# Patient Record
Sex: FEMALE | Race: WHITE | Employment: STUDENT | URBAN - METROPOLITAN AREA
[De-identification: names, ages, dates, MRNs, and addresses within clinical notes are randomized per-mention and may not be internally consistent; named-entity substitution may affect disease eponyms.]

---

## 2018-07-13 ENCOUNTER — OFFICE VISIT (OUTPATIENT)
Dept: ORTHOPEDIC SURGERY | Age: 23
End: 2018-07-13

## 2018-07-13 VITALS
HEIGHT: 65 IN | WEIGHT: 210 LBS | DIASTOLIC BLOOD PRESSURE: 70 MMHG | BODY MASS INDEX: 34.99 KG/M2 | HEART RATE: 76 BPM | SYSTOLIC BLOOD PRESSURE: 123 MMHG

## 2018-07-13 DIAGNOSIS — S93.431A SPRAIN OF TIBIOFIBULAR LIGAMENT OF RIGHT ANKLE, INITIAL ENCOUNTER: Primary | ICD-10-CM

## 2018-07-13 PROCEDURE — 99213 OFFICE O/P EST LOW 20 MIN: CPT | Performed by: ORTHOPAEDIC SURGERY

## 2018-07-13 NOTE — LETTER
[facility]  [address]  [address] [facility] [address]  Phone: [phone]  Fax: [phone]    Marichuy Kessler MD        July 13, 2018     Patient: Cyndi Seay   YOB: 1995   Date of Visit: 7/13/2018       To Whom It May Concern: It is my medical opinion that Ace Santiago may return to light duty immediately with the following restrictions: no running or jumping for 4 weeks. If you have any questions or concerns, please don't hesitate to call.     Sincerely,            Marichuy Kessler MD

## 2018-07-22 NOTE — PROGRESS NOTES
Date of current encounter: 7/13/2018  Chief Complaint    Right Ankle Pain ([facility]; New, ED referral for RT ankle pain. XR on 7/10/18/ picnic table collasped on leg, DOI 7/10/18 )      History of Present Illness:  Kenyetta Ga is a 23 y.o. 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 While working at the summer [employer] a picnic table collapsed on her resulting in a twisting injury and landing on her right leg. This occurred on July 10 and she was seen in the ER on the date of injury. Initially after the injury she had difficulty bearing weight which prompted her to seek treatment in the ER. She also had some soreness in her right knee which seems to be resolving. X-rays in the ER were negative for fracture. She was placed in an Aircast stirrup splint and recommended for reduced activity and interval follow-up. She reports her pain has improved 3 days since the injury. Current pain is as described below. The more she is on the ankle more uncomfortable it becomes. She is wearing her Aircast stirrup splint but also has an over-the-counter lace up brace available. She is accompanied by her father. She is noticing improved pain with her naproxen. No history of prior knee or ankle injury or surgery.   Pain Assessment  Location of Pain: Ankle  Location Modifiers: Right  Severity of Pain: 4  Quality of Pain: Aching, Dull  Duration of Pain: A few days  Frequency of Pain: Intermittent  Aggravating Factors: Walking, Standing  Limiting Behavior: Some  Relieving Factors: Rest  Result of Injury: Yes  Work-Related Injury: Yes  Are there other pain locations you wish to document?: No    Medical History:  Current Outpatient Prescriptions   Medication Sig Dispense Refill    Pseudoephedrine HCl (SUDAFED PO) Take by mouth      Norgestim-Eth Estrad Triphasic (TRI-SPRINTEC PO) Take by mouth      naproxen (NAPROSYN) 500 MG tablet Take 1 tablet by mouth 2 times daily (with meals) 60 tablet 1     No current facility-administered medications for this visit. Past Medical History:   Diagnosis Date    Asthma     Depression     OCD (obsessive compulsive disorder)      Past Surgical History:   Procedure Laterality Date    TONSILLECTOMY      WISDOM TOOTH EXTRACTION       allergies, social and family histories were reviewed and updated as appropriate. Review of Systems:  Relevant review of systems reviewed and available in the patient's chart    Vital Signs:  /70   Pulse 76   Ht 5' 5\" (1.651 m)   Wt 210 lb (95.3 kg)   LMP 07/01/2018   BMI 34.95 kg/m²     General Exam:   Constitutional: Patient is adequately groomed with no evidence of malnutrition, class I obesity noted  Mental Status: The patient is oriented to time, place and person. The patient's mood and affect are appropriate. Vascular: Examination reveals no swelling or calf tenderness. Peripheral pulses are palpable and 2+. Neurological: The patient has good coordination. There is no weakness or sensory deficit. Right Ankle Examination:    Inspection:  Mild swelling on both the medial and lateral aspects of the ankle. No abrasions or lacerations. No significant varus or valgus hindfoot malalignment. Well formed plantar arch. Palpation:  No tenderness over the malleoli or 5th metatarsal base. No significant pain with metatarsal squeeze. She does have tenderness over the distal tib-fib joint region. No tenderness to palpation over the Achilles. Range of Motion:  Able to initiate ankle dorsiflexion beyond neutral, plantarflexion 30°, inversion 20°, eversion 5°. Strength:  Strength on manual motor testing 4/5 with moderate discomfort. Special Tests:  Negative drawer testing at the ankle. There is pain with tib-fib squeeze and negative Farrell's test.    Skin: There are no rashes, ulcerations or lesions.     Gait: She ambulates with an altered stride length reflecting an antalgic gait

## 2018-07-26 ENCOUNTER — OFFICE VISIT (OUTPATIENT)
Dept: FAMILY MEDICINE CLINIC | Age: 23
End: 2018-07-26

## 2018-07-26 VITALS
WEIGHT: 227 LBS | BODY MASS INDEX: 38.76 KG/M2 | HEART RATE: 80 BPM | OXYGEN SATURATION: 99 % | HEIGHT: 64 IN | DIASTOLIC BLOOD PRESSURE: 88 MMHG | SYSTOLIC BLOOD PRESSURE: 130 MMHG

## 2018-07-26 DIAGNOSIS — Q82.5 CONGENITAL NEVUS: ICD-10-CM

## 2018-07-26 DIAGNOSIS — J45.20 MILD INTERMITTENT ASTHMA WITHOUT COMPLICATION: ICD-10-CM

## 2018-07-26 DIAGNOSIS — L70.9 ACNE, UNSPECIFIED ACNE TYPE: ICD-10-CM

## 2018-07-26 DIAGNOSIS — E66.9 OBESITY (BMI 35.0-39.9 WITHOUT COMORBIDITY): ICD-10-CM

## 2018-07-26 DIAGNOSIS — G43.019 MIGRAINE WITHOUT AURA, INTRACTABLE: Primary | ICD-10-CM

## 2018-07-26 DIAGNOSIS — R03.0 ELEVATED BLOOD PRESSURE READING: ICD-10-CM

## 2018-07-26 LAB
ANION GAP SERPL CALCULATED.3IONS-SCNC: 14 MMOL/L (ref 3–16)
BUN BLDV-MCNC: 12 MG/DL (ref 7–20)
CALCIUM SERPL-MCNC: 9.5 MG/DL (ref 8.3–10.6)
CHLORIDE BLD-SCNC: 103 MMOL/L (ref 99–110)
CHOLESTEROL, TOTAL: 223 MG/DL (ref 0–199)
CO2: 25 MMOL/L (ref 21–32)
CREAT SERPL-MCNC: 0.6 MG/DL (ref 0.6–1.1)
GFR AFRICAN AMERICAN: >60
GFR NON-AFRICAN AMERICAN: >60
GLUCOSE BLD-MCNC: 113 MG/DL (ref 70–99)
HCT VFR BLD CALC: 38.7 % (ref 36–48)
HDLC SERPL-MCNC: 68 MG/DL (ref 40–60)
HEMOGLOBIN: 13.3 G/DL (ref 12–16)
LDL CHOLESTEROL CALCULATED: 120 MG/DL
MCH RBC QN AUTO: 28.7 PG (ref 26–34)
MCHC RBC AUTO-ENTMCNC: 34.2 G/DL (ref 31–36)
MCV RBC AUTO: 84 FL (ref 80–100)
PDW BLD-RTO: 13.6 % (ref 12.4–15.4)
PLATELET # BLD: 294 K/UL (ref 135–450)
PMV BLD AUTO: 8 FL (ref 5–10.5)
POTASSIUM SERPL-SCNC: 3.5 MMOL/L (ref 3.5–5.1)
RBC # BLD: 4.61 M/UL (ref 4–5.2)
SODIUM BLD-SCNC: 142 MMOL/L (ref 136–145)
TRIGL SERPL-MCNC: 174 MG/DL (ref 0–150)
TSH REFLEX FT4: 1.56 UIU/ML (ref 0.27–4.2)
VLDLC SERPL CALC-MCNC: 35 MG/DL
WBC # BLD: 9 K/UL (ref 4–11)

## 2018-07-26 PROCEDURE — 36415 COLL VENOUS BLD VENIPUNCTURE: CPT | Performed by: INTERNAL MEDICINE

## 2018-07-26 PROCEDURE — 90471 IMMUNIZATION ADMIN: CPT | Performed by: INTERNAL MEDICINE

## 2018-07-26 PROCEDURE — 90732 PPSV23 VACC 2 YRS+ SUBQ/IM: CPT | Performed by: INTERNAL MEDICINE

## 2018-07-26 PROCEDURE — 99204 OFFICE O/P NEW MOD 45 MIN: CPT | Performed by: INTERNAL MEDICINE

## 2018-07-26 RX ORDER — SUMATRIPTAN 50 MG/1
50 TABLET, FILM COATED ORAL
COMMUNITY
Start: 2017-05-29 | End: 2018-07-26

## 2018-07-26 RX ORDER — TOPIRAMATE 25 MG/1
TABLET ORAL
Qty: 70 TABLET | Refills: 0 | Status: SHIPPED | OUTPATIENT
Start: 2018-07-26 | End: 2018-08-16 | Stop reason: SDUPTHER

## 2018-07-26 RX ORDER — ALBUTEROL SULFATE 90 UG/1
2 AEROSOL, METERED RESPIRATORY (INHALATION) EVERY 6 HOURS PRN
Qty: 1 INHALER | Refills: 3 | Status: SHIPPED | OUTPATIENT
Start: 2018-07-26 | End: 2020-03-16 | Stop reason: SDUPTHER

## 2018-07-26 RX ORDER — SUMATRIPTAN 50 MG/1
50 TABLET, FILM COATED ORAL
Qty: 9 TABLET | Refills: 3 | Status: SHIPPED | OUTPATIENT
Start: 2018-07-26 | End: 2021-01-20 | Stop reason: SDUPTHER

## 2018-07-26 ASSESSMENT — PATIENT HEALTH QUESTIONNAIRE - PHQ9
2. FEELING DOWN, DEPRESSED OR HOPELESS: 0
SUM OF ALL RESPONSES TO PHQ9 QUESTIONS 1 & 2: 0
1. LITTLE INTEREST OR PLEASURE IN DOING THINGS: 0
SUM OF ALL RESPONSES TO PHQ QUESTIONS 1-9: 0

## 2018-07-26 NOTE — PATIENT INSTRUCTIONS
flashing lights or dark spots, or sensitivity to bright light or loud noise. Note if the headache occurred near your period. List anything that might have triggered the headache. Triggers may include certain foods (chocolate, cheese, wine) or odors, smoke, bright light, stress, or lack of sleep. · If your doctor has prescribed medicine for your migraines, take it as directed. You may have medicine that you take only when you get a migraine and medicine that you take all the time to help prevent migraines. ¨ If your doctor has prescribed medicine for when you get a headache, take it at the first sign of a migraine, unless your doctor has given you other instructions. ¨ If your doctor has prescribed medicine to prevent migraines, take it exactly as prescribed. Call your doctor if you think you are having a problem with your medicine. · Find healthy ways to deal with stress. Migraines are most common during or right after stressful times. Take time to relax before and after you do something that has caused a migraine in the past.  · Try to keep your muscles relaxed by keeping good posture. Check your jaw, face, neck, and shoulder muscles for tension. Try to relax them. When you sit at a desk, change positions often. And make sure to stretch for 30 seconds each hour. · Get plenty of sleep and exercise. · Eat meals on a regular schedule. Avoid foods and drinks that often trigger migraines. These include chocolate, alcohol (especially red wine and port), aspartame, monosodium glutamate (MSG), and some additives found in foods (such as hot dogs, israel, cold cuts, aged cheeses, and pickled foods). · Limit caffeine. Don't drink too much coffee, tea, or soda. But don't quit caffeine suddenly. That can also give you migraines. · Do not smoke or allow others to smoke around you. If you need help quitting, talk to your doctor about stop-smoking programs and medicines.  These can increase your chances of quitting for

## 2018-07-26 NOTE — PROGRESS NOTES
Arm, Position: Sitting, Cuff Size: Large Adult)   Pulse 80   Ht 5' 4\" (1.626 m)   Wt 227 lb (103 kg)   LMP 07/01/2018   SpO2 99%   Breastfeeding? No   BMI 38.96 kg/m²     @LASTSAO2(3)@    Wt Readings from Last 3 Encounters:   07/26/18 227 lb (103 kg)   07/13/18 210 lb (95.3 kg)   07/10/18 230 lb 13.2 oz (104.7 kg)       Physical Exam     NAD alert and cooperative  HEENT:TMs unremarkable. Small hypopharynx. Good dentition. No nasal salute. Slight lungs with decreased breath sounds slightly increased ID ratio without any wheezes rales or rhonchi  Cardiovascular exam regular rate and rhythm without any murmur click. No ectopy. Increased P2  Positive obesity without any hepatosplenomegaly no epigastric tenderness or rebound. Good range of motion of joints. DTRs are equal. No focal weakness. Positive papules pustules and scarring of her back and face. Hyperpigmented macule back with well-defined margins 3 x 2\"      Chemistry    No results found for: NA, K, CL, CO2, BUN, CREATININE No results found for: CALCIUM, ALKPHOS, AST, ALT, BILITOT         No results found for: WBC, HGB, HCT, MCV, PLT  No results found for: LABA1C  No results found for: EAG  No results found for: LABA1C  No components found for: CHLPL  No results found for: TRIG  No results found for: HDL  No results found for: LDLCALC  No results found for: LABVLDL    Old labs and records reviewed or requested  Discussed past lab and studies with patient      Diagnosis Orders   1. Migraine without aura, intractable  CBC   2. Mild intermittent asthma without complication     3. Obesity (BMI 35.0-39.9 without comorbidity)  Lipid Panel    TSH WITH REFLEX TO FT4    Hemoglobin A1C    Testosterone, free, total   4. Acne, unspecified acne type     5. Congenital nevus     6. Elevated blood pressure reading  Basic Metabolic Panel     Recurrent migraines. We'll try Topamax. Continue on with Imitrex. No she cannot get pregnant on this medication.  Follow-up with

## 2018-07-27 LAB
ESTIMATED AVERAGE GLUCOSE: 108.3 MG/DL
HBA1C MFR BLD: 5.4 %

## 2018-07-28 LAB
SEX HORMONE BINDING GLOBULIN: 159 NMOL/L (ref 30–135)
TESTOSTERONE FREE-NONMALE: 1.4 PG/ML (ref 0.8–7.4)
TESTOSTERONE TOTAL: 26 NG/DL (ref 20–70)

## 2018-07-30 ENCOUNTER — TELEPHONE (OUTPATIENT)
Dept: FAMILY MEDICINE CLINIC | Age: 23
End: 2018-07-30

## 2018-08-16 ENCOUNTER — OFFICE VISIT (OUTPATIENT)
Dept: FAMILY MEDICINE CLINIC | Age: 23
End: 2018-08-16

## 2018-08-16 VITALS
HEART RATE: 83 BPM | SYSTOLIC BLOOD PRESSURE: 110 MMHG | HEIGHT: 65 IN | WEIGHT: 219 LBS | DIASTOLIC BLOOD PRESSURE: 74 MMHG | OXYGEN SATURATION: 98 % | BODY MASS INDEX: 36.49 KG/M2 | RESPIRATION RATE: 12 BRPM

## 2018-08-16 DIAGNOSIS — G43.109 MIGRAINE WITH AURA AND WITHOUT STATUS MIGRAINOSUS, NOT INTRACTABLE: ICD-10-CM

## 2018-08-16 DIAGNOSIS — T14.8XXA SPLINTER: ICD-10-CM

## 2018-08-16 DIAGNOSIS — E66.9 OBESITY (BMI 35.0-39.9 WITHOUT COMORBIDITY): Primary | ICD-10-CM

## 2018-08-16 PROCEDURE — 99212 OFFICE O/P EST SF 10 MIN: CPT | Performed by: INTERNAL MEDICINE

## 2018-08-16 RX ORDER — TOPIRAMATE 50 MG/1
TABLET, FILM COATED ORAL
Qty: 180 TABLET | Refills: 0 | Status: SHIPPED | OUTPATIENT
Start: 2018-08-16 | End: 2019-01-15

## 2018-08-16 NOTE — PROGRESS NOTES
HPI: Megan Lee presents for weight check, medication, and splinter. Splinter in left foot. Mother removed last night . Wondering if it was all gone. still mild pain    Elevated BMI. Started on Topamax for headache. Is having some tingling. Has lost weight. Decreased calories. Exercising. Pleased with results. Twice weekly headaches with migraine. Has decreased in frequency with institution of Topamax 50 mg twice a day. . Had only one migraine this month. Not using Motrin. Imitrex was helpful. Positive birth control. Positive family history of headaches. History concussion with visual field defect and normal CT. Aware pregnancy is contraindicated with this medicine and to taper off when we discontinue.     PMH:    Migraines     SH lives with mom, GM brother sister and cousin. [employer] counselor   ,  childhood education. 3 times weekly 30 min heterosexual partner one year. No longer using condoms both were checked for STDs     FH: 1 brother 1 sister    + MGM breast cancer 52's, migraines. MI 60's,depression alcohol.     - HTN      ROS: No syncope. Positive sinus issues, acne, snoring no apnea. Occasional wheeze. No chest pain palpitations or syncope. No breast concerns. Rare GE reflux. No constipation. Had had urinary tract infections in the past. No menstrual concerns on birth control pills. No concern with STD.       Constitutional, ent, CV, respiratory, GI, , joint, skin, allergic and psychiatric ROS negative except for above     Allergies   Allergen Reactions    Latex        Outpatient Prescriptions Marked as Taking for the 8/16/18 encounter (Office Visit) with Sarah Esquivel MD   Medication Sig Dispense Refill    albuterol sulfate HFA (PROAIR HFA) 108 (90 Base) MCG/ACT inhaler Inhale 2 puffs into the lungs every 6 hours as needed for Wheezing 1 Inhaler 3    topiramate (TOPAMAX) 25 MG tablet . 1 po q day x 1 week then bid x 1 week then 2 in am and 1 in pm x 1 week then 2 po bid.   Follow up

## 2019-01-15 ENCOUNTER — OFFICE VISIT (OUTPATIENT)
Dept: FAMILY MEDICINE CLINIC | Age: 24
End: 2019-01-15
Payer: COMMERCIAL

## 2019-01-15 VITALS
RESPIRATION RATE: 12 BRPM | HEART RATE: 87 BPM | BODY MASS INDEX: 37.49 KG/M2 | HEIGHT: 65 IN | SYSTOLIC BLOOD PRESSURE: 138 MMHG | DIASTOLIC BLOOD PRESSURE: 82 MMHG | WEIGHT: 225 LBS | OXYGEN SATURATION: 98 %

## 2019-01-15 DIAGNOSIS — J45.20 MILD INTERMITTENT ASTHMA WITHOUT COMPLICATION: ICD-10-CM

## 2019-01-15 DIAGNOSIS — R25.3 FASCICULATION: Primary | ICD-10-CM

## 2019-01-15 DIAGNOSIS — F80.81 STUTTER: ICD-10-CM

## 2019-01-15 DIAGNOSIS — L70.0 ACNE VULGARIS: ICD-10-CM

## 2019-01-15 DIAGNOSIS — G43.909 MIGRAINE WITHOUT STATUS MIGRAINOSUS, NOT INTRACTABLE, UNSPECIFIED MIGRAINE TYPE: ICD-10-CM

## 2019-01-15 LAB — TSH REFLEX FT4: 1.67 UIU/ML (ref 0.27–4.2)

## 2019-01-15 PROCEDURE — 36415 COLL VENOUS BLD VENIPUNCTURE: CPT | Performed by: INTERNAL MEDICINE

## 2019-01-15 PROCEDURE — 94010 BREATHING CAPACITY TEST: CPT | Performed by: INTERNAL MEDICINE

## 2019-01-15 PROCEDURE — 99214 OFFICE O/P EST MOD 30 MIN: CPT | Performed by: INTERNAL MEDICINE

## 2019-01-15 RX ORDER — NORTRIPTYLINE HYDROCHLORIDE 25 MG/1
25 CAPSULE ORAL NIGHTLY
Qty: 30 CAPSULE | Refills: 0 | Status: SHIPPED | OUTPATIENT
Start: 2019-01-15 | End: 2019-02-05 | Stop reason: SDUPTHER

## 2019-01-15 RX ORDER — ERYTHROMYCIN AND BENZOYL PEROXIDE 30; 50 MG/G; MG/G
GEL TOPICAL
Qty: 46.6 G | Refills: 1 | Status: SHIPPED | OUTPATIENT
Start: 2019-01-15 | End: 2019-02-14

## 2019-01-15 RX ORDER — MINOCYCLINE HYDROCHLORIDE 50 MG/1
CAPSULE ORAL
Qty: 30 CAPSULE | Refills: 1 | Status: SHIPPED | OUTPATIENT
Start: 2019-01-15 | End: 2019-04-23 | Stop reason: SDUPTHER

## 2019-02-05 ENCOUNTER — OFFICE VISIT (OUTPATIENT)
Dept: FAMILY MEDICINE CLINIC | Age: 24
End: 2019-02-05
Payer: COMMERCIAL

## 2019-02-05 VITALS
OXYGEN SATURATION: 99 % | SYSTOLIC BLOOD PRESSURE: 142 MMHG | HEIGHT: 65 IN | RESPIRATION RATE: 12 BRPM | WEIGHT: 229 LBS | DIASTOLIC BLOOD PRESSURE: 84 MMHG | BODY MASS INDEX: 38.15 KG/M2 | HEART RATE: 86 BPM

## 2019-02-05 DIAGNOSIS — F39 MOOD DISORDER (HCC): ICD-10-CM

## 2019-02-05 DIAGNOSIS — N92.0 MENORRHAGIA WITH REGULAR CYCLE: ICD-10-CM

## 2019-02-05 DIAGNOSIS — E61.1 IRON DEFICIENCY: ICD-10-CM

## 2019-02-05 DIAGNOSIS — L70.0 ACNE VULGARIS: ICD-10-CM

## 2019-02-05 DIAGNOSIS — G43.109 MIGRAINE WITH AURA AND WITHOUT STATUS MIGRAINOSUS, NOT INTRACTABLE: ICD-10-CM

## 2019-02-05 DIAGNOSIS — F50.89 PICA: Primary | ICD-10-CM

## 2019-02-05 LAB
HCT VFR BLD CALC: 38.9 % (ref 36–48)
HEMOGLOBIN: 13.3 G/DL (ref 12–16)

## 2019-02-05 PROCEDURE — 36415 COLL VENOUS BLD VENIPUNCTURE: CPT | Performed by: INTERNAL MEDICINE

## 2019-02-05 PROCEDURE — 99213 OFFICE O/P EST LOW 20 MIN: CPT | Performed by: INTERNAL MEDICINE

## 2019-02-05 RX ORDER — NORTRIPTYLINE HYDROCHLORIDE 25 MG/1
25 CAPSULE ORAL NIGHTLY
Qty: 30 CAPSULE | Refills: 2 | Status: SHIPPED | OUTPATIENT
Start: 2019-02-05 | End: 2019-04-18 | Stop reason: SDUPTHER

## 2019-02-06 LAB
FERRITIN: 40.3 NG/ML (ref 15–150)
IRON SATURATION: 12 % (ref 15–50)
IRON: 49 UG/DL (ref 37–145)
TOTAL IRON BINDING CAPACITY: 408 UG/DL (ref 260–445)

## 2019-02-06 RX ORDER — FERROUS SULFATE 325(65) MG
TABLET ORAL
Qty: 90 TABLET | Refills: 0 | Status: SHIPPED | OUTPATIENT
Start: 2019-02-06 | End: 2019-06-03

## 2019-02-07 ENCOUNTER — TELEPHONE (OUTPATIENT)
Dept: FAMILY MEDICINE CLINIC | Age: 24
End: 2019-02-07

## 2019-04-18 ENCOUNTER — TELEPHONE (OUTPATIENT)
Dept: FAMILY MEDICINE CLINIC | Age: 24
End: 2019-04-18

## 2019-04-18 RX ORDER — NORTRIPTYLINE HYDROCHLORIDE 25 MG/1
25 CAPSULE ORAL NIGHTLY
Qty: 30 CAPSULE | Refills: 2 | Status: SHIPPED | OUTPATIENT
Start: 2019-04-18 | End: 2019-06-03 | Stop reason: ALTCHOICE

## 2019-04-18 NOTE — TELEPHONE ENCOUNTER
Patient requesting a medication refill. Medication: Nortriptyline  Dosage: 25 mg  Frequency: Nightly  Last filled on: 02/05/2019  Pharmacy: [address] [place] [address] [facility]  Next office visit: 05/14/2019    Pt called in requesting refill on above medication.      Best call back number: [phone]

## 2019-04-22 ENCOUNTER — TELEPHONE (OUTPATIENT)
Dept: FAMILY MEDICINE CLINIC | Age: 24
End: 2019-04-22

## 2019-04-22 NOTE — TELEPHONE ENCOUNTER
States called facility where she had her x-ray done, but they will not release information to pt. Was informed office needs to call with this request. Please call the facility at [phone].

## 2019-04-23 ENCOUNTER — OFFICE VISIT (OUTPATIENT)
Dept: FAMILY MEDICINE CLINIC | Age: 24
End: 2019-04-23
Payer: COMMERCIAL

## 2019-04-23 VITALS
BODY MASS INDEX: 35.65 KG/M2 | SYSTOLIC BLOOD PRESSURE: 125 MMHG | DIASTOLIC BLOOD PRESSURE: 82 MMHG | HEART RATE: 90 BPM | WEIGHT: 214 LBS | RESPIRATION RATE: 12 BRPM | HEIGHT: 65 IN | OXYGEN SATURATION: 98 %

## 2019-04-23 DIAGNOSIS — S99.921S INJURY OF RIGHT FOOT, SEQUELA: Primary | ICD-10-CM

## 2019-04-23 DIAGNOSIS — L70.0 ACNE VULGARIS: ICD-10-CM

## 2019-04-23 DIAGNOSIS — E61.1 IRON DEFICIENCY: ICD-10-CM

## 2019-04-23 DIAGNOSIS — R19.5 CHANGE IN STOOL: ICD-10-CM

## 2019-04-23 DIAGNOSIS — G43.109 MIGRAINE WITH AURA AND WITHOUT STATUS MIGRAINOSUS, NOT INTRACTABLE: ICD-10-CM

## 2019-04-23 DIAGNOSIS — R10.9 ABDOMINAL PAIN, UNSPECIFIED ABDOMINAL LOCATION: ICD-10-CM

## 2019-04-23 PROCEDURE — 99213 OFFICE O/P EST LOW 20 MIN: CPT | Performed by: INTERNAL MEDICINE

## 2019-04-23 RX ORDER — OMEPRAZOLE 20 MG/1
CAPSULE, DELAYED RELEASE ORAL
Qty: 14 CAPSULE | Refills: 0 | Status: SHIPPED | OUTPATIENT
Start: 2019-04-23

## 2019-04-23 RX ORDER — MINOCYCLINE HYDROCHLORIDE 50 MG/1
CAPSULE ORAL
Qty: 30 CAPSULE | Refills: 1 | Status: SHIPPED | OUTPATIENT
Start: 2019-04-23 | End: 2019-06-03 | Stop reason: ALTCHOICE

## 2019-04-23 ASSESSMENT — PATIENT HEALTH QUESTIONNAIRE - PHQ9
SUM OF ALL RESPONSES TO PHQ QUESTIONS 1-9: 0
2. FEELING DOWN, DEPRESSED OR HOPELESS: 0
SUM OF ALL RESPONSES TO PHQ QUESTIONS 1-9: 0
1. LITTLE INTEREST OR PLEASURE IN DOING THINGS: 0
SUM OF ALL RESPONSES TO PHQ9 QUESTIONS 1 & 2: 0

## 2019-04-23 NOTE — PATIENT INSTRUCTIONS
Patient Education        Constipation: Care Instructions  Your Care Instructions    Constipation means that you have a hard time passing stools (bowel movements). People pass stools from 3 times a day to once every 3 days. What is normal for you may be different. Constipation may occur with pain in the rectum and cramping. The pain may get worse when you try to pass stools. Sometimes there are small amounts of bright red blood on toilet paper or the surface of stools. This is because of enlarged veins near the rectum (hemorrhoids). A few changes in your diet and lifestyle may help you avoid ongoing constipation. Your doctor may also prescribe medicine to help loosen your stool. Some medicines can cause constipation. These include pain medicines and antidepressants. Tell your doctor about all the medicines you take. Your doctor may want to make a medicine change to ease your symptoms. Follow-up care is a key part of your treatment and safety. Be sure to make and go to all appointments, and call your doctor if you are having problems. It's also a good idea to know your test results and keep a list of the medicines you take. How can you care for yourself at home? · Drink plenty of fluids, enough so that your urine is light yellow or clear like water. If you have kidney, heart, or liver disease and have to limit fluids, talk with your doctor before you increase the amount of fluids you drink. · Include high-fiber foods in your diet each day. These include fruits, vegetables, beans, and whole grains. · Get at least 30 minutes of exercise on most days of the week. Walking is a good choice. You also may want to do other activities, such as running, swimming, cycling, or playing tennis or team sports. · Take a fiber supplement, such as Citrucel or Metamucil, every day. Read and follow all instructions on the label. · Schedule time each day for a bowel movement. A daily routine may help.  Take your time having your bowel movement. · Support your feet with a small step stool when you sit on the toilet. This helps flex your hips and places your pelvis in a squatting position. · Your doctor may recommend an over-the-counter laxative to relieve your constipation. Examples are Milk of Magnesia and MiraLax. Read and follow all instructions on the label. Do not use laxatives on a long-term basis. When should you call for help? Call your doctor now or seek immediate medical care if:    · You have new or worse belly pain.     · You have new or worse nausea or vomiting.     · You have blood in your stools.    Watch closely for changes in your health, and be sure to contact your doctor if:    · Your constipation is getting worse.     · You do not get better as expected. Where can you learn more? Go to https://Netflixangeloeb.VouchedFor. org and sign in to your [employer] account. Enter 21 [phone] in the [employer] box to learn more about \"Constipation: Care Instructions. \"     If you do not have an account, please click on the \"Sign Up Now\" link. Current as of: September 23, 2018  Content Version: 11.9  © 8922-3457 [employer], [employer]. Care instructions adapted under license by [facility] ([facility]). If you have questions about a medical condition or this instruction, always ask your healthcare professional. [address] any warranty or liability for your use of this information. start Prilosec  Try to use tylenol instead of motrin  Ice. Boot x 1 week crutches 3 more daily  Elevate, ice compress. Don't lean on crutches.   Film if not improving  Follow up exam if abd pain not improved with omeprazole and stool softener or psyllium    Nice weight loss

## 2019-04-23 NOTE — PROGRESS NOTES
HPI: Judah Lunsford presents for evaluation and management of forefoot injury      Chronic health issues include migraines, elevated BMI, dyspnea, acne. Lida hyperextended toes right foot after drinking some alcohol. Went to the emergency room. Film unremarkable. Was given crutches and boot. Is using 100 mg Motrin 3 times a day, ice, Ace, postop shoe and crutches. States that when she had Toradol injection developed abdominal pain and mental status changes. Has been having some black stools since. No iron pills or this month. Has only had 2 stools in the last week. Positive constipation. Mild dyspepsia. No vomiting. Mental status has cleared however stated it took about a day. Is back at work. Has had some constipation intermittently in the past. No laboratories at that time. Is wearing a boot Ace bandage and crutches currently. The ankle sprains in the past. Some swelling in bruising of her foot. Has not missed work    Right ankle sprain. 600 mg tid. Crutches, post op shoe. Hx ankle injury in past. Hyper flexion. + ice. Sleeping. Went to work. Cystic acne improved with medicine however did not get refill ago up to the dermatology appointment. Using topicals. Interested in having medicine again. Did have some improvement. Noted adverse effect. Migraines improved intolerant Topamax. Family history. One a month. Pleased with how she is doing. .        BMI 38 to 35. She's cut down on calories to 1200 in starting to exercise. Pleased with results. Rare knee pain GE reflux. No longer eating ice and no restless leg syndromes. Following with GYN. Positive birth control pills           PMH:    Migraines     SH heterosexual. 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        FH: 1 brother 1 sister    + MGM breast cancer 52's, migraines. MI 60's,depression alcohol.     - HTN      ROS: No syncope. Positive sinus issues, acne, snoring no apnea. Occasional wheeze. No chest pain palpitations or syncope. No breast concerns. Rare GE reflux. Recent constipation. Had had urinary tract infections in the past. No menstrual concerns on birth control pills. No concern with STD. Constitutional, ent, CV, respiratory, GI, , joint, skin, allergic and psychiatric ROS reviewed and negative except for above    Allergies   Allergen Reactions    Latex        Outpatient Medications Marked as Taking for the 4/23/19 encounter (Office Visit) with Marlin Haley MD   Medication Sig Dispense Refill    nortriptyline (PAMELOR) 25 MG capsule Take 1 capsule by mouth nightly For migraine prevention 30 capsule 2    minocycline (MINOCIN) 50 MG capsule 1 po q day 30 capsule 1    albuterol sulfate HFA (PROAIR HFA) 108 (90 Base) MCG/ACT inhaler Inhale 2 puffs into the lungs every 6 hours as needed for Wheezing 1 Inhaler 3    Norgestim-Eth Estrad Triphasic (TRI-SPRINTEC PO) Take by mouth               Past Medical History:   Diagnosis Date    Asthma     Depression     Heavy menses 4/23/2019    Iron deficiency 4/23/2019    OCD (obsessive compulsive disorder)        Past Surgical History:   Procedure Laterality Date    TONSILLECTOMY      WISDOM TOOTH EXTRACTION                       Review of Systems          Objective     /82   Pulse 90   Resp 12   Ht 5' 5\" (1.651 m)   Wt 214 lb (97.1 kg)   SpO2 98% Comment: RA  BMI 35.61 kg/m²     @LASTSAO2(3)@    Wt Readings from Last 3 Encounters:   04/23/19 214 lb (97.1 kg)   02/05/19 229 lb (103.9 kg)   01/15/19 225 lb (102.1 kg)       Physical Exam     NAD alert and cooperative  HEENT: Good dentition. Crowded hypopharynx. Full neck. No adenopathy. HEENT conjunctiva. No icterus. Lungs are clear. Good ID ratio without any wheezes rales or rhonchi. Cardiovascular exam regular rate and rhythm without any murmur click. Abdomen positive bowel sounds. Mild left lower quadrant tenderness. No rebound.  No epigastric tenderness or mass  Mild swelling fourth toe distally. Ecchymoses base of the distal right foot along the metacarpal joints. Good range of motion of the toes. Good pulses. Sensation intact. No abrasion. No warmth. Walks well with her crutches. No cords. Chemistry        Component Value Date/Time     07/26/2018 1607    K 3.5 07/26/2018 1607     07/26/2018 1607    CO2 25 07/26/2018 1607    BUN 12 07/26/2018 1607    CREATININE 0.6 07/26/2018 1607        Component Value Date/Time    CALCIUM 9.5 07/26/2018 1607            Lab Results   Component Value Date    WBC 9.0 07/26/2018    HGB 13.3 02/05/2019    HCT 38.9 02/05/2019    MCV 84.0 07/26/2018     07/26/2018     Lab Results   Component Value Date    LABA1C 5.4 07/26/2018     Lab Results   Component Value Date    .3 07/26/2018     Lab Results   Component Value Date    LABA1C 5.4 07/26/2018     No components found for: CHLPL  Lab Results   Component Value Date    TRIG 174 (H) 07/26/2018     Lab Results   Component Value Date    HDL 68 (H) 07/26/2018     Lab Results   Component Value Date    LDLCALC 120 (H) 07/26/2018     Lab Results   Component Value Date    LABVLDL 35 07/26/2018       Old labs and records reviewed or requested  Discussed past lab and studies with patient      Diagnosis Orders   1. Injury of right foot, sequela  XR FOOT RIGHT (2 VIEWS)   2. Acne vulgaris  BLOOD OCCULT STOOL #1   3. Migraine with aura and without status migrainosus, not intractable     4. Iron deficiency     5. Abdominal pain, unspecified abdominal location     6. Change in stool       Forfeit pain. No fracture. Ice switch to Tylenol as dyspepsia with her Motrin 800. If not improved film. Black stools. History of iron deficiency. Not interested in repeat laboratory today secondary to cost. We'll  occult. Stool. Successful weight loss. Praise given. No follow-ups on file. Diagnosis and treatment discussed.   Possible side effects of medication reviewed  Patients questions answered  Follow up understood  Pt aware if they are not contacted about any test results , this does not mean they are normal.  They should call

## 2019-04-24 ENCOUNTER — NURSE ONLY (OUTPATIENT)
Dept: FAMILY MEDICINE CLINIC | Age: 24
End: 2019-04-24
Payer: COMMERCIAL

## 2019-04-24 ENCOUNTER — TELEPHONE (OUTPATIENT)
Dept: FAMILY MEDICINE CLINIC | Age: 24
End: 2019-04-24

## 2019-04-24 DIAGNOSIS — L70.0 ACNE VULGARIS: ICD-10-CM

## 2019-04-24 DIAGNOSIS — E61.1 IRON DEFICIENCY: ICD-10-CM

## 2019-04-24 DIAGNOSIS — K92.1 BLACK STOOL: ICD-10-CM

## 2019-04-24 DIAGNOSIS — R19.5 OCCULT BLOOD IN STOOLS: Primary | ICD-10-CM

## 2019-04-24 LAB
HEMOCCULT STL QL: ABNORMAL
HEMOCCULT STL QL: ABNORMAL
HEMOCCULT STL QL: POSITIVE

## 2019-04-24 PROCEDURE — 82270 OCCULT BLOOD FECES: CPT | Performed by: INTERNAL MEDICINE

## 2019-06-03 ENCOUNTER — ANESTHESIA EVENT (OUTPATIENT)
Dept: ENDOSCOPY | Age: 24
End: 2019-06-03
Payer: COMMERCIAL

## 2019-06-03 RX ORDER — METHOCARBAMOL 750 MG/1
750 TABLET, FILM COATED ORAL
Status: ON HOLD | COMMUNITY
Start: 2019-05-12 | End: 2019-06-04 | Stop reason: ALTCHOICE

## 2019-06-03 RX ORDER — ONDANSETRON 4 MG/1
4 TABLET, FILM COATED ORAL
COMMUNITY
Start: 2019-05-12 | End: 2019-06-03 | Stop reason: ALTCHOICE

## 2019-06-03 NOTE — PROGRESS NOTES
[facility] [facility] time______0830______        Surgery time____________    Take the following medications with a sip of water:SEE MAR    Do not eat or drink anything after 12:00 midnight prior to your surgery. This includes water chewing gum, mints and ice chips. You may brush your teeth and gargle the morning of your surgery, but do not swallow the water     Please see your family doctor/pediatrician for a history and physical and/or concerning medications. Bring any test results/reports from your physicians office. If you are under the care of a heart doctor or specialist doctor, please be aware that you may be asked to them for clearance    You may be asked to stop blood thinners such as Coumadin, Plavix, Fragmin, Lovenox, etc., or any anti-inflammatories such as:  Aspirin, Ibuprofen, Advil, Naproxen prior to your surgery. We also ask that you stop any OTC medications such as fish oil, vitamin E, glucosamine, garlic, Multivitamins, COQ 10, etc.    We ask that you do not smoke 24 hours prior to surgery  We ask that you do not  drink any alcoholic beverages 24 hours prior to surgery     You must make arrangements for a responsible adult to take you home after your surgery. For your safety you will not be allowed to leave alone or drive yourself home. Your surgery will be cancelled if you do not have a ride home. Also for your safety, it is strongly suggested that someone stay with you the first 24 hours after your surgery. A parent or legal guardian must accompany a child scheduled for surgery and plan to stay at the hospital until the child is discharged. Please do not bring other children with you. For your comfort, please wear simple loose fitting clothing to the hospital.  Please do not bring valuables.     Do not wear any make-up or nail polish on your fingers or toes      For your safety, please do not wear any jewelry or body piercing's on the day of surgery. All jewelry must be removed. If you have dentures, they will be removed before going to operating room. For your convenience, we will provide you with a container. If you wear contact lenses or glasses, they will be removed, please bring a case for them. If you have a living will and a durable power of  for healthcare, please bring in a copy. As part of our patient safety program to minimize surgical site infections, we ask you to do the following:    · Please notify your surgeon if you develop any illness between         now and the  day of your surgery. · This includes a cough, cold, fever, sore throat, nausea,         or vomiting, and diarrhea, etc.  ·  Please notify your surgeon if you experience dizziness, shortness         of breath or blurred vision between now and the time of your surgery. Do not shave your operative site 96 hours prior to surgery. For face and neck surgery, men may use an electric razor 48 hours   prior to surgery. You may shower the night before surgery or the morning of   your surgery with an antibacterial soap. You will need to bring a photo ID and insurance card    [facility] has an onsite pharmacy, would you like to utilize our pharmacy     If you will be staying overnight and use a C-pap machine, please bring   your C-pap to hospital     Our goal is to provide you with excellent care, therefore, visitors will be limited to two(2) in the room at a time so that we may focus on providing this care for you. Please contact pre-admission testing if you have any further questions. [facility] phone number:  [phone] PAT fax number:  [phone]  Please note these are generalized instructions for all surgical cases, you may be provided with more specific instructions according to your surgery.

## 2019-06-04 ENCOUNTER — HOSPITAL ENCOUNTER (OUTPATIENT)
Age: 24
Setting detail: OUTPATIENT SURGERY
Discharge: HOME | End: 2019-06-04
Attending: INTERNAL MEDICINE | Admitting: INTERNAL MEDICINE
Payer: COMMERCIAL

## 2019-06-04 ENCOUNTER — ANESTHESIA (OUTPATIENT)
Dept: ENDOSCOPY | Age: 24
End: 2019-06-04
Payer: COMMERCIAL

## 2019-06-04 VITALS
OXYGEN SATURATION: 99 % | RESPIRATION RATE: 16 BRPM | HEIGHT: 66 IN | TEMPERATURE: 97.9 F | WEIGHT: 209.44 LBS | SYSTOLIC BLOOD PRESSURE: 120 MMHG | BODY MASS INDEX: 33.66 KG/M2 | HEART RATE: 74 BPM | DIASTOLIC BLOOD PRESSURE: 74 MMHG

## 2019-06-04 VITALS
SYSTOLIC BLOOD PRESSURE: 119 MMHG | OXYGEN SATURATION: 100 % | RESPIRATION RATE: 18 BRPM | DIASTOLIC BLOOD PRESSURE: 66 MMHG

## 2019-06-04 DIAGNOSIS — K92.1 MELENA: ICD-10-CM

## 2019-06-04 LAB — PREGNANCY, URINE: NEGATIVE

## 2019-06-04 PROCEDURE — 3700000001 HC ADD 15 MINUTES (ANESTHESIA): Performed by: INTERNAL MEDICINE

## 2019-06-04 PROCEDURE — 2500000003 HC RX 250 WO HCPCS: Performed by: NURSE ANESTHETIST, CERTIFIED REGISTERED

## 2019-06-04 PROCEDURE — 3700000000 HC ANESTHESIA ATTENDED CARE: Performed by: INTERNAL MEDICINE

## 2019-06-04 PROCEDURE — 6360000002 HC RX W HCPCS: Performed by: NURSE ANESTHETIST, CERTIFIED REGISTERED

## 2019-06-04 PROCEDURE — 2580000003 HC RX 258: Performed by: ANESTHESIOLOGY

## 2019-06-04 PROCEDURE — 84703 CHORIONIC GONADOTROPIN ASSAY: CPT

## 2019-06-04 PROCEDURE — 88305 TISSUE EXAM BY PATHOLOGIST: CPT

## 2019-06-04 PROCEDURE — 3609012400 HC EGD TRANSORAL BIOPSY SINGLE/MULTIPLE: Performed by: INTERNAL MEDICINE

## 2019-06-04 PROCEDURE — 3609010300 HC COLONOSCOPY W/BIOPSY SINGLE/MULTIPLE: Performed by: INTERNAL MEDICINE

## 2019-06-04 PROCEDURE — 2709999900 HC NON-CHARGEABLE SUPPLY: Performed by: INTERNAL MEDICINE

## 2019-06-04 PROCEDURE — 7100000010 HC PHASE II RECOVERY - FIRST 15 MIN: Performed by: INTERNAL MEDICINE

## 2019-06-04 PROCEDURE — 7100000011 HC PHASE II RECOVERY - ADDTL 15 MIN: Performed by: INTERNAL MEDICINE

## 2019-06-04 PROCEDURE — 7100000000 HC PACU RECOVERY - FIRST 15 MIN: Performed by: INTERNAL MEDICINE

## 2019-06-04 RX ORDER — OXYCODONE HYDROCHLORIDE 5 MG/1
5 TABLET ORAL PRN
Status: DISCONTINUED | OUTPATIENT
Start: 2019-06-04 | End: 2019-06-04 | Stop reason: HOSPADM

## 2019-06-04 RX ORDER — MORPHINE SULFATE 2 MG/ML
1 INJECTION, SOLUTION INTRAMUSCULAR; INTRAVENOUS EVERY 5 MIN PRN
Status: DISCONTINUED | OUTPATIENT
Start: 2019-06-04 | End: 2019-06-04 | Stop reason: HOSPADM

## 2019-06-04 RX ORDER — SODIUM CHLORIDE 9 MG/ML
INJECTION, SOLUTION INTRAVENOUS CONTINUOUS
Status: DISCONTINUED | OUTPATIENT
Start: 2019-06-04 | End: 2019-06-04 | Stop reason: HOSPADM

## 2019-06-04 RX ORDER — PROPOFOL 10 MG/ML
INJECTION, EMULSION INTRAVENOUS PRN
Status: DISCONTINUED | OUTPATIENT
Start: 2019-06-04 | End: 2019-06-04 | Stop reason: SDUPTHER

## 2019-06-04 RX ORDER — KETAMINE HCL IN NACL, ISO-OSM 100MG/10ML
SYRINGE (ML) INJECTION PRN
Status: DISCONTINUED | OUTPATIENT
Start: 2019-06-04 | End: 2019-06-04 | Stop reason: SDUPTHER

## 2019-06-04 RX ORDER — MEPERIDINE HYDROCHLORIDE 25 MG/ML
12.5 INJECTION INTRAMUSCULAR; INTRAVENOUS; SUBCUTANEOUS EVERY 5 MIN PRN
Status: DISCONTINUED | OUTPATIENT
Start: 2019-06-04 | End: 2019-06-04 | Stop reason: HOSPADM

## 2019-06-04 RX ORDER — SODIUM CHLORIDE 0.9 % (FLUSH) 0.9 %
10 SYRINGE (ML) INJECTION EVERY 12 HOURS SCHEDULED
Status: DISCONTINUED | OUTPATIENT
Start: 2019-06-04 | End: 2019-06-04 | Stop reason: HOSPADM

## 2019-06-04 RX ORDER — LIDOCAINE HYDROCHLORIDE 20 MG/ML
INJECTION, SOLUTION EPIDURAL; INFILTRATION; INTRACAUDAL; PERINEURAL PRN
Status: DISCONTINUED | OUTPATIENT
Start: 2019-06-04 | End: 2019-06-04 | Stop reason: SDUPTHER

## 2019-06-04 RX ORDER — ONDANSETRON 2 MG/ML
4 INJECTION INTRAMUSCULAR; INTRAVENOUS
Status: DISCONTINUED | OUTPATIENT
Start: 2019-06-04 | End: 2019-06-04 | Stop reason: HOSPADM

## 2019-06-04 RX ORDER — SODIUM CHLORIDE 0.9 % (FLUSH) 0.9 %
10 SYRINGE (ML) INJECTION PRN
Status: DISCONTINUED | OUTPATIENT
Start: 2019-06-04 | End: 2019-06-04 | Stop reason: HOSPADM

## 2019-06-04 RX ORDER — FENTANYL CITRATE 50 UG/ML
25 INJECTION, SOLUTION INTRAMUSCULAR; INTRAVENOUS EVERY 5 MIN PRN
Status: DISCONTINUED | OUTPATIENT
Start: 2019-06-04 | End: 2019-06-04 | Stop reason: HOSPADM

## 2019-06-04 RX ORDER — PROPOFOL 10 MG/ML
INJECTION, EMULSION INTRAVENOUS CONTINUOUS PRN
Status: DISCONTINUED | OUTPATIENT
Start: 2019-06-04 | End: 2019-06-04 | Stop reason: SDUPTHER

## 2019-06-04 RX ORDER — MORPHINE SULFATE 2 MG/ML
2 INJECTION, SOLUTION INTRAMUSCULAR; INTRAVENOUS EVERY 5 MIN PRN
Status: DISCONTINUED | OUTPATIENT
Start: 2019-06-04 | End: 2019-06-04 | Stop reason: HOSPADM

## 2019-06-04 RX ORDER — OXYCODONE HYDROCHLORIDE 5 MG/1
10 TABLET ORAL PRN
Status: DISCONTINUED | OUTPATIENT
Start: 2019-06-04 | End: 2019-06-04 | Stop reason: HOSPADM

## 2019-06-04 RX ORDER — GLYCOPYRROLATE 0.2 MG/ML
INJECTION INTRAMUSCULAR; INTRAVENOUS PRN
Status: DISCONTINUED | OUTPATIENT
Start: 2019-06-04 | End: 2019-06-04 | Stop reason: SDUPTHER

## 2019-06-04 RX ORDER — FENTANYL CITRATE 50 UG/ML
50 INJECTION, SOLUTION INTRAMUSCULAR; INTRAVENOUS EVERY 5 MIN PRN
Status: DISCONTINUED | OUTPATIENT
Start: 2019-06-04 | End: 2019-06-04 | Stop reason: HOSPADM

## 2019-06-04 RX ADMIN — PROPOFOL 180 MCG/KG/MIN: 10 INJECTION, EMULSION INTRAVENOUS at 10:04

## 2019-06-04 RX ADMIN — PROPOFOL 100 MG: 10 INJECTION, EMULSION INTRAVENOUS at 10:04

## 2019-06-04 RX ADMIN — LIDOCAINE HYDROCHLORIDE 5 ML: 20 INJECTION, SOLUTION EPIDURAL; INFILTRATION; INTRACAUDAL; PERINEURAL at 10:04

## 2019-06-04 RX ADMIN — SODIUM CHLORIDE: 9 INJECTION, SOLUTION INTRAVENOUS at 09:28

## 2019-06-04 RX ADMIN — Medication 30 MG: at 10:04

## 2019-06-04 RX ADMIN — GLYCOPYRROLATE 0.1 MG: 0.2 INJECTION, SOLUTION INTRAMUSCULAR; INTRAVENOUS at 09:58

## 2019-06-04 ASSESSMENT — PULMONARY FUNCTION TESTS
PIF_VALUE: 0

## 2019-06-04 ASSESSMENT — ENCOUNTER SYMPTOMS: SHORTNESS OF BREATH: 0

## 2019-06-04 ASSESSMENT — PAIN - FUNCTIONAL ASSESSMENT
PAIN_FUNCTIONAL_ASSESSMENT: PREVENTS OR INTERFERES SOME ACTIVE ACTIVITIES AND ADLS
PAIN_FUNCTIONAL_ASSESSMENT: 0-10

## 2019-06-04 ASSESSMENT — PAIN SCALES - GENERAL
PAINLEVEL_OUTOF10: 0

## 2019-06-04 ASSESSMENT — LIFESTYLE VARIABLES: SMOKING_STATUS: 0

## 2019-06-04 ASSESSMENT — PAIN DESCRIPTION - DESCRIPTORS: DESCRIPTORS: BURNING;ACHING;STABBING

## 2019-06-04 NOTE — ANESTHESIA PRE PROCEDURE
Brianna Patton, POCCL, POCBUN, POCHEMO, POCHCT in the last 72 hours. Coags: No results found for: PROTIME, INR, APTT    HCG (If Applicable): No results found for: PREGTESTUR, PREGSERUM, HCG, HCGQUANT     ABGs: No results found for: PHART, PO2ART, OKB6JQX, HDC8IHB, BEART, M1ROXMDV     Type & Screen (If Applicable):  No results found for: LABABO, [address]    Anesthesia Evaluation  Patient summary reviewed no history of anesthetic complications:   Airway: Mallampati: II  TM distance: >3 FB   Neck ROM: full  Mouth opening: > = 3 FB Dental:          Pulmonary:   (+) asthma:     (-) pneumonia, COPD, shortness of breath, recent URI, sleep apnea and not a current smoker                           Cardiovascular:Negative CV ROS  Exercise tolerance: good (>4 METS),                     Neuro/Psych:   (+) headaches:, psychiatric history:   (-) seizures, neuromuscular disease, TIA, CVA and depression/anxiety            GI/Hepatic/Renal: Neg GI/Hepatic/Renal ROS            Endo/Other: Negative Endo/Other ROS                    Abdominal:           Vascular: negative vascular ROS. Anesthesia Plan      MAC     ASA 2           MIPS: Prophylactic antiemetics administered. Anesthetic plan and risks discussed with patient and mother. Plan discussed with CRNA. Dread Garrett MD   6/4/2019      This pre-anesthesia assessment may be used as a history and physical.    DOS STAFF ADDENDUM:    Pt seen and examined, chart reviewed (including anesthesia, drug and allergy history). No interval changes to history and physical examination. Anesthetic plan, risks, benefits, alternatives, and personnel involved discussed with patient. Patient verbalized an understanding and agrees to proceed.       Dread Garrett MD  June 4, 2019  9:03 AM

## 2019-06-04 NOTE — OP NOTE
Endoscopy Note    Patient: Delisa Castillo  : 1995  Acct#:     Procedure: Esophagogastroduodenoscopy with biopsy                       Colonoscopy with biopsy    Date:  2019     Surgeon:   Gissel Davison MD    Referring Physician:  Toyin Dozier MD    Indications: The patient is a 26 y.o. female  who presents for EGD and colonoscopy due to iron deficiency anemia and chronic intermittent bloody diarrhea. Postoperative Diagnosis:    1. Normal EGD. Biopsies obtained from the duodenal bulb and the second portion of the duodenum to evaluate for Celiac disease. 2. Mildly edematous terminal ileal mucosa with possible mild erythema. Biopsies were obtained to evaluate for Crohn's ileitis. 3. Normal colonic mucosa throughout the entire colon. Random biopsies were obtained from the cecum, right colon, left colon and rectosigmoid for evaluation of microscopic colitis. Anesthesia:  MAC    Consent:  The patient or their legal guardian has signed a consent, and is aware of the potential risks, benefits, alternatives, and potential complications of this procedure. These include, but are not limited to hemorrhage, bleeding, post procedural pain, perforation, phlebitis, aspiration, hypotension, hypoxia, cardiovascular events such as arryhthmia, and possibly death. Additionally, the possibility of missed colonic polyps and interval colon cancer was discussed in the consent. Description of Procedure: The patient was then taken to the endoscopy suite, placed in the left lateral decubitus position and the above IV sedation was administrered. The Olympus video endoscope was placed through the patient's oropharynx without difficulty to the extent of the 2nd portion of the duodenum. Both forward and retroflexed views of the stomach were obtained. Findings:    Esophagus: The esophagus appeared normal without evidence of Mcpherson's esophagus or reflux esophagitis.  The Z line was located 40 cm from the incisors. Stomach: The stomach appeared normal on forward and retroflexed views. There were no ulcerations or erosions. Duodenum: The first and 2nd portions of the duodenum appeared normal with normal villous pattern. Biopsies obtained from the duodenal bulb and the second portion of the duodenum to evaluate for Celiac disease. The scope was then withdrawn back into the stomach, it was decompressed, and the scope was completely withdrawn. A digital rectal examination was performed and revealed negative without mass, lesions or tenderness. The Olympus video colonoscope was placed in the patient's rectum under digital direction and advanced to the cecum. The cecum was identified by characteristic anatomy and ballottment. The prep was good. The ileocecal valve was identified. The terminal ileum was inspected for approximately 15 cm, and was notable for mildly edematous terminal ileal mucosa with possible mild erythema. Biopsies were obtained to evaluate for Crohn's ileitis. .    The scope was then withdrawn back through the cecum, ascending, transverse, descending, sigmoid colon, and rectum. Careful circumferential examination of the mucosa in these areas demonstrated normal colonic mucosa throughout the entire colon. Random biopsies were obtained from the cecum, right colon, left colon and rectosigmoid for evaluation of microscopic colitis. The scope was then withdrawn into the rectum and retroflexed. The retroflexed view of the anal verge and rectum demonstrates internal hemorrhoids. The scope was straightened, the colon was decompressed and the scope was withdrawn from the patient. The patient tolerated the procedure well and was taken to the PACU in good condition. Estimated Blood Loss: Minimal     Impression:  See above    Recommendations:   - Follow-up pathology results in 7 days, by calling the office at [phone].  - Resume regular medications.   - Resume diet as tolerated. - Repeat colonoscopy based upon pathology, likely at the age of 41.  - Return to clinic in 4-6 weeks.     ZAYNAB Caban 16 and Patricia E Humphrey Fish

## 2019-06-04 NOTE — PROGRESS NOTES
To pacu from endo. PT awake. States she needs to go to the bathroom. Encouraged pt to lay on left side and pass air-a pt does well and passes air well. Abd soft. IV infusing. Monitor in sinus rhythm.

## 2019-06-04 NOTE — ANESTHESIA POSTPROCEDURE EVALUATION
Department of Anesthesiology  Postprocedure Note    Patient: Anna Keane  MRN: [id number]  YOB: 1995  Date of evaluation: 6/4/2019  Time:  5:22 PM     Procedure Summary     Date:  06/04/19 Room / Location:  [facility] 04 / [facility] ENDOSCOPY    Anesthesia Start:  9325 Anesthesia Stop:  1039    Procedures:       EGD BIOPSY (N/A )      COLONOSCOPY WITH BIOPSY (N/A ) Diagnosis:       Melena      (MELENA, DIARRHEA)    Surgeon:  Mendel Mahoney MD Responsible Provider:  Jmaila Cevallos MD    Anesthesia Type:  MAC ASA Status:  2          Anesthesia Type: MAC    Gustabo Phase I: Gustabo Score: 10    Gustabo Phase II: Gustabo Score: 10    Last vitals: Reviewed and per EMR flowsheets.        Anesthesia Post Evaluation    Patient location during evaluation: PACU  Patient participation: complete - patient participated  Level of consciousness: awake and alert  Pain score: 0  Airway patency: patent  Nausea & Vomiting: no nausea and no vomiting  Complications: no  Cardiovascular status: blood pressure returned to baseline  Respiratory status: acceptable  Hydration status: euvolemic

## 2019-06-04 NOTE — H&P
Pre-operative History and Physical    Patient: Mc Ngo  : 1995  Acct#:     Intended Procedure:  EGD and colonoscopy    HISTORY OF PRESENT ILLNESS:  The patient is a 26 y.o. female  who presents for EGD and colonoscopy due to iron deficiency anemia and chronic intermittent bloody diarrhea. Past Medical History:        Diagnosis Date    Asthma     Depression     Heavy menses 2019    Iron deficiency 2019    OCD (obsessive compulsive disorder)      Past Surgical History:        Procedure Laterality Date    TONSILLECTOMY      WISDOM TOOTH EXTRACTION       Medications Prior to Admission:   No current facility-administered medications on file prior to encounter. Current Outpatient Medications on File Prior to Encounter   Medication Sig Dispense Refill    Norgestim-Eth Estrad Triphasic (TRI-SPRINTEC PO) Take by mouth      omeprazole (PRILOSEC) 20 MG delayed release capsule 1 po q day x 14 days 14 capsule 0    albuterol sulfate HFA (PROAIR HFA) 108 (90 Base) MCG/ACT inhaler Inhale 2 puffs into the lungs every 6 hours as needed for Wheezing 1 Inhaler 3    SUMAtriptan (IMITREX) 50 MG tablet Take 1 tablet by mouth once as needed for Migraine I table po once a day as needed for migraine. Not a daily med 9 tablet 3        Allergies:  Latex; Hibiscus extract; and Other    Social History:   TOBACCO:   reports that she has never smoked. She has never used smokeless tobacco.  ETOH:   reports that she drinks alcohol. DRUGS:   reports that she does not use drugs. PHYSICAL EXAM:      Vital Signs: /86   Pulse 76   Temp 97.6 °F (36.4 °C) (Temporal)   Resp 20   Ht 5' 6\" (1.676 m)   Wt 209 lb 7 oz (95 kg)   LMP 2019   SpO2 99%   BMI 33.80 kg/m²    Airway: No stridor or wheezing noted. Good air movement  Pulmonary: without wheezes.   Clear to auscultation  Cardiac:regular rate and rhythm without loud murmurs  Abdomen:soft, nontender,  Bowel sounds present    Pre-Procedure Assessment / Plan:  1) EGD and Colonoscopy    ASA Grade:  ASA 2 - Patient with mild systemic disease with no functional limitations  Mallampati Classification:  Class II    Level of Sedation Plan:MAC    Post Procedure plan: Return to same level of care    I assessed the patient and find that the patient is in satisfactory condition to proceed with the planned procedure and sedation plan. I have explained the risk, benefits, and alternatives to the procedure; the patient understands and agrees to proceed.        Normie Brittle, MD  6/4/2019

## 2019-09-16 ENCOUNTER — OFFICE VISIT (OUTPATIENT)
Dept: FAMILY MEDICINE CLINIC | Age: 24
End: 2019-09-16
Payer: COMMERCIAL

## 2019-09-16 VITALS
SYSTOLIC BLOOD PRESSURE: 150 MMHG | BODY MASS INDEX: 35.84 KG/M2 | HEART RATE: 86 BPM | HEIGHT: 66 IN | WEIGHT: 223 LBS | DIASTOLIC BLOOD PRESSURE: 90 MMHG | RESPIRATION RATE: 12 BRPM | OXYGEN SATURATION: 97 %

## 2019-09-16 DIAGNOSIS — Z87.59 HISTORY OF MISCARRIAGE: ICD-10-CM

## 2019-09-16 DIAGNOSIS — M54.2 NECK PAIN: ICD-10-CM

## 2019-09-16 DIAGNOSIS — R29.90 ABNORMAL NEUROLOGICAL EXAM: Primary | ICD-10-CM

## 2019-09-16 DIAGNOSIS — R20.2 PARESTHESIA: ICD-10-CM

## 2019-09-16 LAB
BASOPHILS ABSOLUTE: 0 K/UL (ref 0–0.2)
BASOPHILS RELATIVE PERCENT: 0.5 %
EOSINOPHILS ABSOLUTE: 0.3 K/UL (ref 0–0.6)
EOSINOPHILS RELATIVE PERCENT: 4.1 %
HCG QUALITATIVE: NEGATIVE
HCT VFR BLD CALC: 37.4 % (ref 36–48)
HEMOGLOBIN: 12.9 G/DL (ref 12–16)
LYMPHOCYTES ABSOLUTE: 2.2 K/UL (ref 1–5.1)
LYMPHOCYTES RELATIVE PERCENT: 26.8 %
MCH RBC QN AUTO: 30.2 PG (ref 26–34)
MCHC RBC AUTO-ENTMCNC: 34.4 G/DL (ref 31–36)
MCV RBC AUTO: 87.7 FL (ref 80–100)
MONOCYTES ABSOLUTE: 0.6 K/UL (ref 0–1.3)
MONOCYTES RELATIVE PERCENT: 7 %
NEUTROPHILS ABSOLUTE: 5 K/UL (ref 1.7–7.7)
NEUTROPHILS RELATIVE PERCENT: 61.6 %
PDW BLD-RTO: 13.2 % (ref 12.4–15.4)
PLATELET # BLD: 254 K/UL (ref 135–450)
PMV BLD AUTO: 8.2 FL (ref 5–10.5)
RBC # BLD: 4.26 M/UL (ref 4–5.2)
VITAMIN B-12: 350 PG/ML (ref 211–911)
WBC # BLD: 8.1 K/UL (ref 4–11)

## 2019-09-16 PROCEDURE — 36415 COLL VENOUS BLD VENIPUNCTURE: CPT | Performed by: INTERNAL MEDICINE

## 2019-09-16 PROCEDURE — 99213 OFFICE O/P EST LOW 20 MIN: CPT | Performed by: INTERNAL MEDICINE

## 2019-09-16 RX ORDER — TIZANIDINE 4 MG/1
TABLET ORAL
Qty: 30 TABLET | Refills: 0 | Status: SHIPPED | OUTPATIENT
Start: 2019-09-16 | End: 2021-03-29

## 2019-09-16 NOTE — PATIENT INSTRUCTIONS
moment. It's a process of purposefully paying attention to and being aware of your surroundings, your emotions, your thoughts, and how your body feels. You are aware of these things, but you aren't judging these experiences as \"good\" or \"bad. \" Mindfulness can help you learn to calm your mind and body to help you cope with illness, pain, and stress. How does mindfulness help to relieve stress? Mindfulness can help quiet your mind and relax your body. Studies show that it can help some people sleep better, feel less anxious, and bring their blood pressure down. And it's been shown to help some people live and cope better with certain health problems like heart disease, depression, chronic pain, and cancer. How do you practice mindfulness? To be mindful is to pay attention, to be present, and to be accepting. · When you're mindful, you do just one thing and you pay close attention to that one thing. For example, you may sit quietly and notice your emotions or how your food tastes and smells. · When you're present, you focus on the things that are happening right now. You let go of your thoughts about the past and the future. When you dwell on the past or the future, you miss moments that can heal and strengthen you. You may miss moments like hearing a child laugh or seeing a friendly face when you think you're all alone. · When you're accepting, you don't  the present moment. Instead you accept your thoughts and feelings as they come. You can practice anytime, anywhere, and in any way you choose. You can practice in many ways. Here are a few ideas:  · While doing your chores, like washing the dishes, let your mind focus on what's in your hand. What does the dish feel like? Is the water warm or cold? · Go outside and take a few deep breaths. What is the air like? Is it warm or cold? · When you can, take some time at the start of your day to sit alone and think. · Take a slow walk by yourself.  Count your

## 2019-09-16 NOTE — PROGRESS NOTES
Problems Maternal Grandmother     No Known Problems Maternal Grandfather     No Known Problems Paternal Grandfather     No Known Problems Other     Anesth Problems Neg Hx     Broken Bones Neg Hx     Cancer Neg Hx     Clotting Disorder Neg Hx     Collagen Disease Neg Hx     Diabetes Neg Hx     Dislocations Neg Hx     Osteoporosis Neg Hx     Rheumatologic Disease Neg Hx     Scoliosis Neg Hx     Severe Sprains Neg Hx            Review of Systems        Objective     BP (!) 153/94   Pulse 86   Resp 12   Ht 5' 6\" (1.676 m)   Wt 223 lb (101.2 kg)   SpO2 97% Comment: RA  BMI 35.99 kg/m²     @LASTSAO2(3)@    Wt Readings from Last 3 Encounters:   09/16/19 223 lb (101.2 kg)   06/04/19 209 lb 7 oz (95 kg)   04/23/19 214 lb (97.1 kg)       Physical Exam     NAD alert and cooperative  HEENT: Pale conjunctive a. Throat is clear. Cranial nerves are intact. Pain on flexion and rotation of the neck. No point tenderness cervical spine. Tenderness upper thoracic disc spine. Throat is clear. No adenopathy. Equal muscle strength upper extremities. Remittent cogwheeling. No fasciculations. No edema. Sensation intact. No current rash. Mild decreased right hip flexor. DTRs diminished but present in the upper extremities. Present in the lower extremities. Downgoing toes. Lungs are clear. No wheezes rales or rhonchi. Cardiovascular exam regular rate and rhythm without any murmur or click. No S4. No point tenderness the abdomen. No rebound.   No suspicious skin lesions or nodules      Chemistry        Component Value Date/Time     07/26/2018 1607    K 3.5 07/26/2018 1607     07/26/2018 1607    CO2 25 07/26/2018 1607    BUN 12 07/26/2018 1607    CREATININE 0.6 07/26/2018 1607        Component Value Date/Time    CALCIUM 9.5 07/26/2018 1607            Lab Results   Component Value Date    WBC 9.0 07/26/2018    HGB 13.3 02/05/2019    HCT 38.9 02/05/2019    MCV 84.0 07/26/2018     07/26/2018     Lab Results   Component Value Date    LABA1C 5.4 07/26/2018     Lab Results   Component Value Date    .3 07/26/2018     Lab Results   Component Value Date    LABA1C 5.4 07/26/2018     No components found for: CHLPL  Lab Results   Component Value Date    TRIG 174 (H) 07/26/2018     Lab Results   Component Value Date    HDL 68 (H) 07/26/2018     Lab Results   Component Value Date    LDLCALC 120 (H) 07/26/2018     Lab Results   Component Value Date    LABVLDL 35 07/26/2018       Old labs and records reviewed or requested  Discussed past lab and studies with patient      Diagnosis Orders   1. Abnormal neurological exam  AFL - Titus Navarro MD, Neurology, [facility]   2. Neck pain  CBC Auto Differential   3. History of miscarriage  HCG, SERUM, QUALITATIVE   4. Paresthesia  Sedimentation Rate    Vitamin B12     Abnormal but perplexed seeing neurologic exam.  Fluctuating symptoms. Neck discomfort without evidence of meningitis. History migraines. No trauma. Consider Guyon Barré post immunization. Laboratories today. Knows that if she has increasing weakness dysphasia shortness of breath, worsening symptoms to report to the emergency room. Neurologic referral was given    Patient reports a miscarriage however I see no documentation of this at her emergency room visit. Urine pregnancy test was negative. We will get beta-hCG today. Follow-up with GYN. Elevated blood pressure. Need to have this rechecked again. Follow back up post neurology    Discussed stress and how this could cause many symptoms. Not interested in medications at this time. Return follow up post neuro. Diagnosis and treatment discussed.   Possible side effects of medication reviewed  Patients questions answered  Follow up understood  Pt aware if they are not contacted about any test results , this does not mean they are normal.  They should call

## 2019-09-17 LAB — SEDIMENTATION RATE, ERYTHROCYTE: 29 MM/HR (ref 0–20)

## 2020-01-06 PROBLEM — S06.0XAA CONCUSSION: Status: ACTIVE | Noted: 2020-01-06

## 2020-01-27 ENCOUNTER — OFFICE VISIT (OUTPATIENT)
Dept: FAMILY MEDICINE CLINIC | Age: 25
End: 2020-01-27
Payer: COMMERCIAL

## 2020-01-27 VITALS
BODY MASS INDEX: 37.77 KG/M2 | WEIGHT: 235 LBS | DIASTOLIC BLOOD PRESSURE: 89 MMHG | TEMPERATURE: 98 F | RESPIRATION RATE: 16 BRPM | HEART RATE: 82 BPM | OXYGEN SATURATION: 98 % | SYSTOLIC BLOOD PRESSURE: 138 MMHG | HEIGHT: 66 IN

## 2020-01-27 LAB
A/G RATIO: 1.8 (ref 1.1–2.2)
ALBUMIN SERPL-MCNC: 4.4 G/DL (ref 3.4–5)
ALP BLD-CCNC: 92 U/L (ref 40–129)
ALT SERPL-CCNC: 30 U/L (ref 10–40)
ANION GAP SERPL CALCULATED.3IONS-SCNC: 16 MMOL/L (ref 3–16)
AST SERPL-CCNC: 25 U/L (ref 15–37)
BACTERIA: ABNORMAL /HPF
BASOPHILS ABSOLUTE: 0 K/UL (ref 0–0.2)
BASOPHILS RELATIVE PERCENT: 0.4 %
BILIRUB SERPL-MCNC: 0.4 MG/DL (ref 0–1)
BILIRUBIN URINE: NEGATIVE
BLOOD, URINE: ABNORMAL
BUN BLDV-MCNC: 7 MG/DL (ref 7–20)
C-REACTIVE PROTEIN: 10.4 MG/L (ref 0–5.1)
CALCIUM SERPL-MCNC: 9.7 MG/DL (ref 8.3–10.6)
CHLORIDE BLD-SCNC: 102 MMOL/L (ref 99–110)
CLARITY: ABNORMAL
CO2: 21 MMOL/L (ref 21–32)
COLOR: ABNORMAL
COMMENT UA: ABNORMAL
CREAT SERPL-MCNC: 0.7 MG/DL (ref 0.6–1.1)
EOSINOPHILS ABSOLUTE: 0.3 K/UL (ref 0–0.6)
EOSINOPHILS RELATIVE PERCENT: 3.2 %
EPITHELIAL CELLS, UA: 13 /HPF (ref 0–5)
GFR AFRICAN AMERICAN: >60
GFR NON-AFRICAN AMERICAN: >60
GLOBULIN: 2.4 G/DL
GLUCOSE BLD-MCNC: 107 MG/DL (ref 70–99)
GLUCOSE URINE: NEGATIVE MG/DL
HCT VFR BLD CALC: 40.6 % (ref 36–48)
HEMOGLOBIN: 14.1 G/DL (ref 12–16)
KETONES, URINE: NEGATIVE MG/DL
LEUKOCYTE ESTERASE, URINE: NEGATIVE
LYMPHOCYTES ABSOLUTE: 1.9 K/UL (ref 1–5.1)
LYMPHOCYTES RELATIVE PERCENT: 21 %
MCH RBC QN AUTO: 30.5 PG (ref 26–34)
MCHC RBC AUTO-ENTMCNC: 34.8 G/DL (ref 31–36)
MCV RBC AUTO: 87.5 FL (ref 80–100)
MICROSCOPIC EXAMINATION: YES
MONOCYTES ABSOLUTE: 0.6 K/UL (ref 0–1.3)
MONOCYTES RELATIVE PERCENT: 6.8 %
MUCUS: ABNORMAL /LPF
NEUTROPHILS ABSOLUTE: 6.1 K/UL (ref 1.7–7.7)
NEUTROPHILS RELATIVE PERCENT: 68.6 %
NITRITE, URINE: NEGATIVE
PDW BLD-RTO: 13.9 % (ref 12.4–15.4)
PH UA: 6 (ref 5–8)
PLATELET # BLD: 268 K/UL (ref 135–450)
PMV BLD AUTO: 8.6 FL (ref 5–10.5)
POTASSIUM SERPL-SCNC: 3.9 MMOL/L (ref 3.5–5.1)
PROTEIN UA: ABNORMAL MG/DL
RBC # BLD: 4.64 M/UL (ref 4–5.2)
RBC UA: 6 /HPF (ref 0–4)
SODIUM BLD-SCNC: 139 MMOL/L (ref 136–145)
SPECIFIC GRAVITY UA: 1.02 (ref 1–1.03)
TOTAL PROTEIN: 6.8 G/DL (ref 6.4–8.2)
URINE TYPE: ABNORMAL
UROBILINOGEN, URINE: 0.2 E.U./DL
WBC # BLD: 8.8 K/UL (ref 4–11)
WBC UA: 5 /HPF (ref 0–5)

## 2020-01-27 PROCEDURE — 36415 COLL VENOUS BLD VENIPUNCTURE: CPT | Performed by: INTERNAL MEDICINE

## 2020-01-27 PROCEDURE — 99213 OFFICE O/P EST LOW 20 MIN: CPT | Performed by: INTERNAL MEDICINE

## 2020-01-27 RX ORDER — FLUOXETINE 10 MG/1
40 CAPSULE ORAL DAILY
COMMUNITY
End: 2022-06-30 | Stop reason: SDUPTHER

## 2020-01-27 RX ORDER — HYOSCYAMINE SULFATE 0.12 MG/1
TABLET SUBLINGUAL
Qty: 40 EACH | Refills: 0 | Status: SHIPPED | OUTPATIENT
Start: 2020-01-27

## 2020-01-27 ASSESSMENT — PATIENT HEALTH QUESTIONNAIRE - PHQ9
SUM OF ALL RESPONSES TO PHQ QUESTIONS 1-9: 0
1. LITTLE INTEREST OR PLEASURE IN DOING THINGS: 0
2. FEELING DOWN, DEPRESSED OR HOPELESS: 0
SUM OF ALL RESPONSES TO PHQ QUESTIONS 1-9: 0
SUM OF ALL RESPONSES TO PHQ9 QUESTIONS 1 & 2: 0

## 2020-01-27 NOTE — PROGRESS NOTES
Grandfather     No Known Problems Paternal Grandfather     No Known Problems Other     Anesth Problems Neg Hx     Broken Bones Neg Hx     Cancer Neg Hx     Clotting Disorder Neg Hx     Collagen Disease Neg Hx     Diabetes Neg Hx     Dislocations Neg Hx     Osteoporosis Neg Hx     Rheumatologic Disease Neg Hx     Scoliosis Neg Hx     Severe Sprains Neg Hx            Review of Systems      Objective     /89   Pulse 82   Temp 98 °F (36.7 °C)   Resp 16   Ht 5' 6\" (1.676 m)   Wt 235 lb (106.6 kg)   SpO2 98% Comment: RA  BMI 37.93 kg/m²     @LASTSAO2(3)@    Wt Readings from Last 3 Encounters:   01/27/20 235 lb (106.6 kg)   09/16/19 223 lb (101.2 kg)   06/04/19 209 lb 7 oz (95 kg)       Physical Exam     NAD alert and cooperative appears well  HEENT: TMs are unremarkable. Small hypopharynx. Tonsillectomy. No cystic acne currently. Lungs are clear. No wheezes rales or rhonchi. Cardiovascular exam regular rate and rhythm without any murmur click. Positive bowel sounds. No mass. No rebound. Diffuse tenderness. Uncomfortable lying flat. No pelvic pain. Good pulses lower extremities.   No suspicious rash or joint swelling        Chemistry        Component Value Date/Time     07/26/2018 1607    K 3.5 07/26/2018 1607     07/26/2018 1607    CO2 25 07/26/2018 1607    BUN 12 07/26/2018 1607    CREATININE 0.6 07/26/2018 1607        Component Value Date/Time    CALCIUM 9.5 07/26/2018 1607            Lab Results   Component Value Date    WBC 8.1 09/16/2019    HGB 12.9 09/16/2019    HCT 37.4 09/16/2019    MCV 87.7 09/16/2019     09/16/2019     Lab Results   Component Value Date    LABA1C 5.4 07/26/2018     Lab Results   Component Value Date    .3 07/26/2018     Lab Results   Component Value Date    LABA1C 5.4 07/26/2018     No components found for: CHLPL  Lab Results   Component Value Date    TRIG 174 (H) 07/26/2018     Lab Results   Component Value Date    HDL 68 (H)

## 2020-01-27 NOTE — LETTER
[facility] [address],[place] [address]  Phone: [phone]  Fax: [phone]    Ann Vera MD        January 27, 2020     Patient: Emmy Wang   YOB: 1995   Date of Visit: 1/27/2020       To Whom It May Concern:    [place] was seen today with illness    l.     Sincerely,        Ann Vera MD

## 2020-01-27 NOTE — PATIENT INSTRUCTIONS
\"lactose-free. \") You can have small amounts (2 Tbsp) of cottage, cream, or ricotta cheese. Hard cheeses like cheddar, Jolley, ROSS, and Swiss are okay. So are small amounts (1 oz) of aged or ripened cheeses like Brie, blue, and feta. Avoid: Milk, including cow, goat, and sheep. Avoid condensed or evaporated milk, buttermilk, custard, cream, sour cream, yogurt, and ice cream. Avoid soy milk. (Check sauces for dairy ingredients.)  Vegetables  Okay to eat: Bamboo shoots, bell peppers, bok xu, up to ½ cup of broccoli or cabbage (red or white), and cucumbers. Eggplant, green beans, lettuce, olives, parsnips, and potatoes are okay to eat. So are pumpkin, rutabaga, seaweed, sprouts, Swiss chard, and spinach. You can eat scallions (green part only) and squash (not butternut). You can eat tomatoes, turnips, watercress, yams, and zucchini. You can also have small amounts of artichoke hearts (from can, 1 oz), carrots, corn (½ cob), and sweet potato (½ cup). Avoid: Artichokes, asparagus, [place] sprouts, noa cabbage, cauliflower, and celery. And avoid garlic, leeks, mushrooms, okra, onions, scallions (white part), shallots, and peas. Fruits  Okay to eat: Bananas, blueberries, cantaloupe, coconut, grapes, and honeydew. Kiwi, sejal, limes, oranges, passion fruit, papaya, and pineapple are also okay. You can eat plantain, raspberries, rhubarb, star fruit, strawberries, tangelo, and tangerine. You can also have small amounts of dried banana chips (up to 10 chips), dried cranberries (1 Tbsp), and shredded coconut (up to ¼ cup). Avoid: Apples, applesauce, apricots, avocados, blackberries, boysenberries, and cherries. Also avoid dates, figs, grapefruit, guava, lychee, and mangoes. Don't eat nectarines, peaches, pears, persimmon, plums, prunes, tamarillo, or watermelon. And limit most canned and dried fruits.   Oils, spices, condiments, and sweeteners  Okay to eat: Vegetable oils (including garlic infused), butter, ghee,

## 2020-01-28 ENCOUNTER — TELEPHONE (OUTPATIENT)
Dept: FAMILY MEDICINE CLINIC | Age: 25
End: 2020-01-28

## 2020-01-29 LAB — URINE CULTURE, ROUTINE: NORMAL

## 2020-03-11 ENCOUNTER — OFFICE VISIT (OUTPATIENT)
Dept: FAMILY MEDICINE CLINIC | Age: 25
End: 2020-03-11
Payer: COMMERCIAL

## 2020-03-11 VITALS
HEIGHT: 66 IN | SYSTOLIC BLOOD PRESSURE: 147 MMHG | WEIGHT: 234 LBS | HEART RATE: 102 BPM | BODY MASS INDEX: 37.61 KG/M2 | DIASTOLIC BLOOD PRESSURE: 94 MMHG | TEMPERATURE: 98.8 F

## 2020-03-11 LAB
INFLUENZA A ANTIGEN, POC: NEGATIVE
INFLUENZA B ANTIGEN, POC: NEGATIVE
STREPTOCOCCUS A RNA: POSITIVE

## 2020-03-11 PROCEDURE — 99213 OFFICE O/P EST LOW 20 MIN: CPT | Performed by: FAMILY MEDICINE

## 2020-03-11 PROCEDURE — 87651 STREP A DNA AMP PROBE: CPT | Performed by: FAMILY MEDICINE

## 2020-03-11 PROCEDURE — 87804 INFLUENZA ASSAY W/OPTIC: CPT | Performed by: FAMILY MEDICINE

## 2020-03-11 RX ORDER — PENICILLIN V POTASSIUM 500 MG/1
500 TABLET ORAL 3 TIMES DAILY
Qty: 30 TABLET | Refills: 0 | Status: SHIPPED | OUTPATIENT
Start: 2020-03-11 | End: 2020-03-21

## 2020-03-11 RX ORDER — FLUCONAZOLE 150 MG/1
150 TABLET ORAL ONCE
Qty: 1 TABLET | Refills: 0 | Status: SHIPPED | OUTPATIENT
Start: 2020-03-11 | End: 2020-03-11

## 2020-03-11 NOTE — PROGRESS NOTES
PROGRESS NOTE     Sharon Cheung MD  [facility] [address] [place] [address]  [phone] office  [phone] fax    Date of Service:  3/11/2020    Subjective:      Patient ID: Kwasi Griffin is a 27 y.o. female      CC: acute illness    HPI    82-year-old , presenting with 1 day of headache, mild cough, sore throat, myalgias, nasal congestion. She does not have a thermometer but was having chills and sweats. No sinus pain /pressure, neck pain, CP/SOB, or GI symptoms. Vitals:    03/11/20 1409   BP: (!) 147/94   Pulse: 102   Temp: 98.8 °F (37.1 °C)   TempSrc: Oral   Weight: 234 lb (106.1 kg)   Height: 5' 6\" (1.676 m)       Outpatient Medications Marked as Taking for the 3/11/20 encounter (Office Visit) with Nicholas Guzman MD   Medication Sig Dispense Refill    penicillin v potassium (VEETID) 500 MG tablet Take 1 tablet by mouth 3 times daily for 10 days 30 tablet 0    fluconazole (DIFLUCAN) 150 MG tablet Take 1 tablet by mouth once for 1 dose 1 tablet 0    etonogestrel (NEXPLANON) 68 MG implant       FLUoxetine (PROZAC) 10 MG capsule Take 10 mg by mouth daily      Hyoscyamine Sulfate SL (LEVSIN/SL) 0.125 MG SUBL 1 po up to qid for abdominal pain 40 each 0    tiZANidine (ZANAFLEX) 4 MG tablet 1 po up to tid prn neck pain.  Do not use with alcohol or drive 30 tablet 0    omeprazole (PRILOSEC) 20 MG delayed release capsule 1 po q day x 14 days 14 capsule 0    albuterol sulfate HFA (PROAIR HFA) 108 (90 Base) MCG/ACT inhaler Inhale 2 puffs into the lungs every 6 hours as needed for Wheezing 1 Inhaler 3    Norgestim-Eth Estrad Triphasic (TRI-SPRINTEC PO) Take by mouth         Past Medical History:   Diagnosis Date    Asthma     Depression     Heavy menses 4/23/2019    Iron deficiency 4/23/2019    OCD (obsessive compulsive disorder)        Past Surgical History:   Procedure Laterality Date    COLONOSCOPY N/A 6/4/2019    COLONOSCOPY hepatosplenomegaly  Musculoskeletal:  · Normal Gait  · All extremities without clubbing, cyanosis or edema  Skin:  · No rashes on inspection      Assessment / Plan:     1. Febrile illness  Poc flu negative. Discussed it is possible to have influenza and strep throat at the same time, especially since she is a . If develops worsening fevers, worsening cough, myalgias, she should return to clinic for further evaluation    2. Strep throat  Rapid strep positive. Treating with penicillin. Discussed side effects and expected course. Pt told to call or return to clinic prn if these symptoms worsen or fail to improve as anticipated.

## 2020-03-12 ENCOUNTER — TELEPHONE (OUTPATIENT)
Dept: FAMILY MEDICINE CLINIC | Age: 25
End: 2020-03-12

## 2020-03-12 NOTE — TELEPHONE ENCOUNTER
PT called in stating that when she was in the office yesterday and saw Dr. Aundrea Farmer and tested positive for strept and  she was told to call back if she started to show signs of a fever because Dr. Aundrea Farmer thought she may have the flu too. PT says that she has been running a fever of 100-101 with medicine. PT would like to know what to do.     Please call PT back: [phone]

## 2020-03-16 ENCOUNTER — TELEPHONE (OUTPATIENT)
Dept: FAMILY MEDICINE CLINIC | Age: 25
End: 2020-03-16

## 2020-03-16 RX ORDER — ALBUTEROL SULFATE 90 UG/1
2 AEROSOL, METERED RESPIRATORY (INHALATION) EVERY 6 HOURS PRN
Qty: 1 INHALER | Refills: 3 | Status: SHIPPED | OUTPATIENT
Start: 2020-03-16 | End: 2020-06-16 | Stop reason: SDUPTHER

## 2020-03-16 RX ORDER — OXYMETAZOLINE HYDROCHLORIDE 5 G/100ML
2 SPRAY NASAL 2 TIMES DAILY
Qty: 1 BOTTLE | Refills: 0 | Status: SHIPPED | OUTPATIENT
Start: 2020-03-16 | End: 2021-03-16

## 2020-03-16 RX ORDER — BENZONATATE 100 MG/1
100 CAPSULE ORAL 3 TIMES DAILY PRN
Qty: 30 CAPSULE | Refills: 0 | Status: SHIPPED | OUTPATIENT
Start: 2020-03-16 | End: 2020-03-26

## 2020-03-16 RX ORDER — AZITHROMYCIN 250 MG/1
250 TABLET, FILM COATED ORAL SEE ADMIN INSTRUCTIONS
Qty: 6 TABLET | Refills: 0 | Status: SHIPPED | OUTPATIENT
Start: 2020-03-16 | End: 2020-03-21

## 2020-03-16 NOTE — TELEPHONE ENCOUNTER
FYI-- states had 3 meds called in, but pharm had not received them. I checked her chart, and meds were sent in to [facility].  Per pt.  they were supposed to have gone to [facility], but she would go ahead and pick them up at the [facility]

## 2020-03-16 NOTE — TELEPHONE ENCOUNTER
PT called in wanting to make a same day appointment. PT is complaining of fever, stuffiness, dry cough, SOB.      Please give PT a call back: [phone]

## 2020-04-24 ENCOUNTER — VIRTUAL VISIT (OUTPATIENT)
Dept: FAMILY MEDICINE CLINIC | Age: 25
End: 2020-04-24
Payer: COMMERCIAL

## 2020-04-24 ENCOUNTER — OFFICE VISIT (OUTPATIENT)
Dept: PRIMARY CARE CLINIC | Age: 25
End: 2020-04-24
Payer: COMMERCIAL

## 2020-04-24 VITALS
DIASTOLIC BLOOD PRESSURE: 80 MMHG | SYSTOLIC BLOOD PRESSURE: 130 MMHG | WEIGHT: 250 LBS | HEIGHT: 65 IN | RESPIRATION RATE: 18 BRPM | HEART RATE: 94 BPM | OXYGEN SATURATION: 97 % | BODY MASS INDEX: 41.65 KG/M2 | TEMPERATURE: 98.5 F

## 2020-04-24 PROCEDURE — 99214 OFFICE O/P EST MOD 30 MIN: CPT | Performed by: FAMILY MEDICINE

## 2020-04-24 PROCEDURE — 99213 OFFICE O/P EST LOW 20 MIN: CPT | Performed by: FAMILY MEDICINE

## 2020-04-24 NOTE — PROGRESS NOTES
[facility]:    [facility] [id number]      2020    Patient ID:  Dinesh Whitney     : 1995    HPI/SYMPTOMS:  Patient is a 27 y.o. female history of mild intermittent asthma by chart review, though no PFTs in chart, presenting with complaints of chest tightness and shortness of breath. Today she had a headache, mild sore throat, and mild nasal congestion. She has had some nausea and mild diarrhea,, bright red blood per rectum, or melena. Denies rhinitis, sinus pain or pressure, neck pain, chest pain, cough, chills or sweats, fever, myalgias,  vomiting,    Asked the last time she had to go to the ED for asthma, henri states call 911 twice in the last year, but EMS assessed her, and concluded she did not need to go to the emergency room. When she are triage, car triage doctor stated she looked like she did not feel well, and could barely keep her head up. For this reason she was taken directly back to our inside clinic, where I quickly went outside to do an evaluation, to ensure she was safe to wait for her turn in line. Patient inside the red clinic at that time. To obtain normal pulse, pulse ox, blood pressure, and temperature at the car side. I also did listen to her lungs at the car side, and there is absolutely no wheezing. Was able to safely wait her 15 minutes before being brought inside for further evaluation. She lives at home with her Fiance and he reports she was feeling \"fine yesterday. \"       The patient's SaO2 assessment for the [facility] was as follows. Initial SaO2 before activity was 97%, HR 94, perceived dyspnea at rest 3. After walking 45-50 feet, the patient's SaO2 was 97%, HR 91. The timed walking test of 20 seconds revealed an SaO2 of 97%, .   The patient's perceived dyspnea with activity is: 6      Vitals:    20 1124   BP: 130/80   Site: Left Upper Arm   Position: Sitting   Pulse: 94   Resp: 18   Temp: 98.5

## 2020-04-24 NOTE — PATIENT INSTRUCTIONS
have a medical emergency and need to call 911, notify the dispatch personnel that you have, or are being evaluated for COVID-19. If possible, put on a facemask before emergency medical services arrive. Discontinuing home isolation  Patients with confirmed COVID-19 should remain under home isolation precautions until the risk of secondary transmission to others is thought to be low. The decision to discontinue home isolation precautions should be made on a case-by-case basis, in consultation with healthcare providers and state and local health departments. The medication list included in this document is our record of what you are currently taking, including any changes that were made at today's visit.  If you find any differences when compared to your medications at home, or have any questions that were not answered at your visit, please contact the office.       Justine Kaye MD

## 2020-04-26 LAB
SARS-COV-2: NOT DETECTED
SOURCE: NORMAL

## 2020-04-27 ENCOUNTER — VIRTUAL VISIT (OUTPATIENT)
Dept: FAMILY MEDICINE CLINIC | Age: 25
End: 2020-04-27
Payer: COMMERCIAL

## 2020-04-27 PROCEDURE — 99213 OFFICE O/P EST LOW 20 MIN: CPT | Performed by: FAMILY MEDICINE

## 2020-04-27 RX ORDER — FLUCONAZOLE 150 MG/1
150 TABLET ORAL DAILY
Qty: 3 TABLET | Refills: 0 | Status: SHIPPED | OUTPATIENT
Start: 2020-04-27 | End: 2020-12-23

## 2020-04-27 RX ORDER — PREDNISONE 10 MG/1
10 TABLET ORAL 2 TIMES DAILY
Qty: 10 TABLET | Refills: 0 | Status: SHIPPED | OUTPATIENT
Start: 2020-04-27 | End: 2020-05-02

## 2020-04-27 RX ORDER — AZITHROMYCIN 250 MG/1
TABLET, FILM COATED ORAL
Qty: 1 PACKET | Refills: 0 | Status: SHIPPED | OUTPATIENT
Start: 2020-04-27 | End: 2020-05-07

## 2020-04-27 NOTE — PROGRESS NOTES
Clotting Disorder Neg Hx     Collagen Disease Neg Hx     Diabetes Neg Hx     Dislocations Neg Hx     Osteoporosis Neg Hx     Rheumatologic Disease Neg Hx     Scoliosis Neg Hx     Severe Sprains Neg Hx      Social History     Socioeconomic History    Marital status: Single     Spouse name: Not on file    Number of children: Not on file    Years of education: Not on file    Highest education level: Not on file   Occupational History    Occupation: Student   Social Needs    Financial resource strain: Not on file    Food insecurity     Worry: Not on file     Inability: Not on file   [employer] needs     Medical: Not on file     Non-medical: Not on file   Tobacco Use    Smoking status: Never Smoker    Smokeless tobacco: Never Used   Substance and Sexual Activity    Alcohol use: Yes     Comment: month whiskey    Drug use: No    Sexual activity: Yes     Partners: Male     Comment: condom   Lifestyle    Physical activity     Days per week: Not on file     Minutes per session: Not on file    Stress: Not on file   Relationships    Social connections     Talks on phone: Not on file     Gets together: Not on file     Attends Jain service: Not on file     Active member of club or organization: Not on file     Attends meetings of clubs or organizations: Not on file     Relationship status: Not on file    Intimate partner violence     Fear of current or ex partner: Not on file     Emotionally abused: Not on file     Physically abused: Not on file     Forced sexual activity: Not on file   Other Topics Concern    Not on file   Social History Narrative    Not on file       Current Outpatient Medications:     azithromycin (ZITHROMAX Z-TRISTA) 250 MG tablet, As directed, Disp: 1 packet, Rfl: 0    predniSONE (DELTASONE) 10 MG tablet, Take 1 tablet by mouth 2 times daily for 5 days, Disp: 10 tablet, Rfl: 0    fluconazole (DIFLUCAN) 150 MG tablet, Take 1 tablet by mouth daily, Disp: 3 tablet, Rfl: 0   Encounters:   04/24/20 250 lb (113.4 kg)   03/11/20 234 lb (106.1 kg)   01/27/20 235 lb (106.6 kg)       PE : virtual visit     Diagnosis Orders   1. Bronchitis  azithromycin (ZITHROMAX Z-TRISTA) 250 MG tablet    denies pregnancy; COVID negative strep negative; z-pack, prednisone diflucan; call INB or worsens     No orders of the defined types were placed in this encounter.

## 2020-06-15 ENCOUNTER — TELEPHONE (OUTPATIENT)
Dept: FAMILY MEDICINE CLINIC | Age: 25
End: 2020-06-15

## 2020-06-16 ENCOUNTER — OFFICE VISIT (OUTPATIENT)
Dept: FAMILY MEDICINE CLINIC | Age: 25
End: 2020-06-16
Payer: COMMERCIAL

## 2020-06-16 VITALS
RESPIRATION RATE: 16 BRPM | WEIGHT: 257 LBS | SYSTOLIC BLOOD PRESSURE: 141 MMHG | DIASTOLIC BLOOD PRESSURE: 89 MMHG | OXYGEN SATURATION: 98 % | HEART RATE: 105 BPM | HEIGHT: 65 IN | BODY MASS INDEX: 42.82 KG/M2 | TEMPERATURE: 97.2 F

## 2020-06-16 LAB
HCT VFR BLD CALC: 41.7 % (ref 36–48)
HEMOGLOBIN: 14 G/DL (ref 12–16)
MCH RBC QN AUTO: 30.4 PG (ref 26–34)
MCHC RBC AUTO-ENTMCNC: 33.6 G/DL (ref 31–36)
MCV RBC AUTO: 90.4 FL (ref 80–100)
PDW BLD-RTO: 14.5 % (ref 12.4–15.4)
PLATELET # BLD: 284 K/UL (ref 135–450)
PMV BLD AUTO: 8 FL (ref 5–10.5)
RBC # BLD: 4.61 M/UL (ref 4–5.2)
TSH REFLEX FT4: 3.05 UIU/ML (ref 0.27–4.2)
WBC # BLD: 10.6 K/UL (ref 4–11)

## 2020-06-16 PROCEDURE — 99214 OFFICE O/P EST MOD 30 MIN: CPT | Performed by: INTERNAL MEDICINE

## 2020-06-16 PROCEDURE — 36415 COLL VENOUS BLD VENIPUNCTURE: CPT | Performed by: INTERNAL MEDICINE

## 2020-06-16 RX ORDER — ALBUTEROL SULFATE 90 UG/1
2 AEROSOL, METERED RESPIRATORY (INHALATION) EVERY 6 HOURS PRN
Qty: 1 INHALER | Refills: 3 | Status: SHIPPED | OUTPATIENT
Start: 2020-06-16 | End: 2022-05-15 | Stop reason: SDUPTHER

## 2020-06-16 NOTE — PROGRESS NOTES
Depression     Heavy menses 4/23/2019    Iron deficiency 4/23/2019    OCD (obsessive compulsive disorder)        Past Surgical History:   Procedure Laterality Date    COLONOSCOPY N/A 6/4/2019    COLONOSCOPY WITH BIOPSY performed by Sg Wynne MD at [address] [place] [address] ENDOSCOPY N/A 6/4/2019    EGD BIOPSY performed by Sg Wynne MD at [facility] EXTRACTION               Family History   Problem Relation Age of Onset    Mult Sclerosis Mother     Breast Cancer Paternal Grandmother     No Known Problems Father     Depression Sister     No Known Problems Brother     No Known Problems Maternal Aunt     No Known Problems Maternal Uncle     No Known Problems Paternal Aunt     No Known Problems Paternal Uncle     No Known Problems Maternal Grandmother     No Known Problems Maternal Grandfather     No Known Problems Paternal Grandfather     No Known Problems Other     Anesth Problems Neg Hx     Broken Bones Neg Hx     Cancer Neg Hx     Clotting Disorder Neg Hx     Collagen Disease Neg Hx     Diabetes Neg Hx     Dislocations Neg Hx     Osteoporosis Neg Hx     Rheumatologic Disease Neg Hx     Scoliosis Neg Hx     Severe Sprains Neg Hx            Review of Systems      Objective     BP (!) 141/89   Pulse 105   Temp 97.2 °F (36.2 °C)   Resp 16   Ht 5' 5\" (1.651 m)   Wt 257 lb (116.6 kg)   SpO2 98% Comment: ra  BMI 42.77 kg/m²     @LASTSAO2(3)@    Wt Readings from Last 3 Encounters:   04/24/20 250 lb (113.4 kg)   03/11/20 234 lb (106.1 kg)   01/27/20 235 lb (106.6 kg)       Physical Exam     NAD alert and cooperative  HEENT: Mild hypopharynx. Good dentition. No erythema posterior pharynx. Palpable thyroid. Right lobe enlarged. Nodule left side. No bruit. Mild tenderness to palpation. No warmth. Lungs are clear. Decreased breath sounds. No wheezes rales or rhonchi. Cardiovascular exam without increased P2. .  No murmur

## 2020-06-16 NOTE — PATIENT INSTRUCTIONS
Call for ultrasound  Ask if you snore or quit breathing at night. Consider sleep apnea testing  No naps.    Consider talking to nutritionist   Cut back on calories as well as carbs

## 2020-06-22 ENCOUNTER — HOSPITAL ENCOUNTER (OUTPATIENT)
Dept: ULTRASOUND IMAGING | Age: 25
Discharge: HOME | End: 2020-06-22
Payer: COMMERCIAL

## 2020-06-22 PROCEDURE — 76536 US EXAM OF HEAD AND NECK: CPT

## 2020-07-07 ENCOUNTER — TELEPHONE (OUTPATIENT)
Dept: FAMILY MEDICINE CLINIC | Age: 25
End: 2020-07-07

## 2020-07-07 NOTE — TELEPHONE ENCOUNTER
PT was recently out of the [place] and now says she has a sinus infection and is wanting to know if PCP would send in something to the Pharmacy.

## 2020-07-07 NOTE — TELEPHONE ENCOUNTER
SPOKE WITH PT SHE IS HAVING NASAL CONGESTION, YELLOW DRAINAGE, AND PRESSURE BEHIND EYES. DENIES SORE THROAT OR COUGH. HAS BEEN USING INHALER WHICH HAS GIVEN SOME RELIEF BUT NOT MUCH.

## 2020-09-24 ENCOUNTER — OFFICE VISIT (OUTPATIENT)
Dept: ENT CLINIC | Age: 25
End: 2020-09-24
Payer: COMMERCIAL

## 2020-09-24 VITALS
HEIGHT: 65 IN | WEIGHT: 268 LBS | BODY MASS INDEX: 44.65 KG/M2 | TEMPERATURE: 97.2 F | SYSTOLIC BLOOD PRESSURE: 166 MMHG | HEART RATE: 90 BPM | DIASTOLIC BLOOD PRESSURE: 102 MMHG

## 2020-09-24 PROCEDURE — 99203 OFFICE O/P NEW LOW 30 MIN: CPT | Performed by: OTOLARYNGOLOGY

## 2020-09-24 PROCEDURE — 31231 NASAL ENDOSCOPY DX: CPT | Performed by: OTOLARYNGOLOGY

## 2020-09-24 RX ORDER — FLUCONAZOLE 100 MG/1
100 TABLET ORAL DAILY
Qty: 2 TABLET | Refills: 0 | Status: SHIPPED | OUTPATIENT
Start: 2020-09-24 | End: 2020-09-26

## 2020-09-24 RX ORDER — DOXYCYCLINE HYCLATE 100 MG
100 TABLET ORAL 2 TIMES DAILY
Qty: 28 TABLET | Refills: 0 | Status: SHIPPED | OUTPATIENT
Start: 2020-09-24 | End: 2020-09-24 | Stop reason: SDUPTHER

## 2020-09-24 RX ORDER — DOXYCYCLINE HYCLATE 100 MG
100 TABLET ORAL 2 TIMES DAILY
Qty: 28 TABLET | Refills: 0 | Status: SHIPPED | OUTPATIENT
Start: 2020-09-24 | End: 2020-10-08

## 2020-09-24 RX ORDER — FLUTICASONE PROPIONATE 50 MCG
1 SPRAY, SUSPENSION (ML) NASAL DAILY
Qty: 2 BOTTLE | Refills: 1 | Status: SHIPPED | OUTPATIENT
Start: 2020-09-24

## 2020-09-24 RX ORDER — FLUTICASONE PROPIONATE 50 MCG
1 SPRAY, SUSPENSION (ML) NASAL DAILY
Qty: 2 BOTTLE | Refills: 1 | Status: SHIPPED | OUTPATIENT
Start: 2020-09-24 | End: 2020-09-24 | Stop reason: SDUPTHER

## 2020-09-24 RX ORDER — FLUCONAZOLE 100 MG/1
100 TABLET ORAL DAILY
Qty: 2 TABLET | Refills: 0 | Status: SHIPPED | OUTPATIENT
Start: 2020-09-24 | End: 2020-09-24 | Stop reason: SDUPTHER

## 2020-09-24 NOTE — PROGRESS NOTES
[facility]      Patient Name: Marina Lofton  Medical Record Number:  <[id number]  Primary Care Physician:  Lana Mercado MD  Date of Consultation: 9/24/2020    Chief Complaint: Sinus issues,, epistaxis        HISTORY OF PRESENT ILLNESS  Edgar Barry is a(n) 23 y.o. female who presents for evaluation of sinus issues and epistaxis. The patient says that she gets relatively frequent sinus infections. She was last treated for one in February. She does have facial pressure, right-sided nosebleeds and sensitivity to smells fairly frequently. She does not think that she breathes great out of her nose. She says that she does have allergies and was allergy tested when she was a child. She did not get immunotherapy. She uses some sort of over-the-counter nasal spray for her allergies at this time. She is never had nasal surgery. She does not think she is had any significant nasal trauma. The bleeding is not significant and is almost always on the right side.       Patient Active Problem List   Diagnosis    Asthma    Migraine with aura and without status migrainosus, not intractable    Obesity (BMI 35.0-39.9 without comorbidity)    Acne    Congenital nevus    Mood disorder (HCC)    Iron deficiency    Heavy menses    Lower abdominal pain    Concussion    Pain of upper abdomen     Past Surgical History:   Procedure Laterality Date    COLONOSCOPY N/A 6/4/2019    COLONOSCOPY WITH BIOPSY performed by Keri García MD at [facility] N/A 6/4/2019    EGD BIOPSY performed by Keri García MD at [facility] EXTRACTION       Family History   Problem Relation Age of Onset    Mult Sclerosis Mother     Breast Cancer Paternal Grandmother     No Known Problems Father     Depression Sister     No Known Problems Brother     No Known Problems Maternal Aunt     No Known Problems Maternal Uncle     No Known Problems Paternal Aunt     No Known Problems Paternal Uncle     No Known Problems Maternal Grandmother     No Known Problems Maternal Grandfather     No Known Problems Paternal Grandfather     No Known Problems Other     Anesth Problems Neg Hx     Broken Bones Neg Hx     Cancer Neg Hx     Clotting Disorder Neg Hx     Collagen Disease Neg Hx     Diabetes Neg Hx     Dislocations Neg Hx     Osteoporosis Neg Hx     Rheumatologic Disease Neg Hx     Scoliosis Neg Hx     Severe Sprains Neg Hx      Social History     Socioeconomic History    Marital status: Single     Spouse name: Not on file    Number of children: Not on file    Years of education: Not on file    Highest education level: Not on file   Occupational History    Occupation: Student   Social Needs    Financial resource strain: Not on file    Food insecurity     Worry: Not on file     Inability: Not on file   [employer] needs     Medical: Not on file     Non-medical: Not on file   Tobacco Use    Smoking status: Never Smoker    Smokeless tobacco: Never Used   Substance and Sexual Activity    Alcohol use: Yes     Comment: month whiskey    Drug use: No    Sexual activity: Yes     Partners: Male     Comment: condom   Lifestyle    Physical activity     Days per week: Not on file     Minutes per session: Not on file    Stress: Not on file   Relationships    Social connections     Talks on phone: Not on file     Gets together: Not on file     Attends Hindu service: Not on file     Active member of club or organization: Not on file     Attends meetings of clubs or organizations: Not on file     Relationship status: Not on file    Intimate partner violence     Fear of current or ex partner: Not on file     Emotionally abused: Not on file     Physically abused: Not on file     Forced sexual activity: Not on file   Other Topics Concern    Not on file   Social History Narrative    Not on file DRUG/FOOD ALLERGIES: Latex; Hibiscus extract; and Other    CURRENT MEDICATIONS  Prior to Admission medications    Medication Sig Start Date End Date Taking? Authorizing Provider   doxycycline hyclate (VIBRA-TABS) 100 MG tablet Take 1 tablet by mouth 2 times daily for 14 days 9/24/20 10/8/20  Jake Nielson MD   fluticasone [facility]) 50 MCG/ACT nasal spray 1 spray by Each Nostril route daily 9/24/20   Jake Nielson MD   fluconazole (DIFLUCAN) 100 MG tablet Take 1 tablet by mouth daily for 2 days 9/24/20 9/26/20  Jake Nielson MD   fluticasone-salmeterol (ADVAIR) 250-50 MCG/DOSE AEPB Inhale 1 puff into the lungs every 12 hours 6/16/20   Easton Sheikh MD   albuterol sulfate HFA (PROAIR HFA) 108 (90 Base) MCG/ACT inhaler Inhale 2 puffs into the lungs every 6 hours as needed for Wheezing 6/16/20   Easton hSeikh MD   fluconazole (DIFLUCAN) 150 MG tablet Take 1 tablet by mouth daily 4/27/20   Sumi Ortega MD   oxymetazoline (12 HOUR NASAL SPRAY) 0.05 % nasal spray 2 sprays by Nasal route 2 times daily 3/16/20 3/16/21  Easton Sheikh MD   etonogestrel ([address]) 68 MG implant  10/22/19   Historical Provider, MD   FLUoxetine (PROZAC) 10 MG capsule Take 40 mg by mouth daily     Historical Provider, MD   Hyoscyamine Sulfate SL (LEVSIN/SL) 0.125 MG SUBL 1 po up to qid for abdominal pain 1/27/20   Easton Sheikh MD   tiZANidine (ZANAFLEX) 4 MG tablet 1 po up to tid prn neck pain. Do not use with alcohol or drive 4/91/12   Easton Sheikh MD   omeprazole (PRILOSEC) 20 MG delayed release capsule 1 po q day x 14 days  Patient not taking: Reported on 6/16/2020 4/23/19   Easton Sheikh MD   SUMAtriptan (IMITREX) 50 MG tablet Take 1 tablet by mouth once as needed for Migraine I table po once a day as needed for migraine.  Not a daily med 7/26/18 6/3/19  Easton Sheikh MD       REVIEW OF SYSTEMS  The following systems were reviewed and revealed the following in addition to any already discussed in the HPI:    CONSTITUTIONAL: no weight loss, no fever, no night sweats, no chills  EYES: no vision changes, no blurry vision  EARS: no changes in hearing, no otalgia  NOSE: Nasal ingestion, epistaxis  RESPIRATORY: no  Difficulty breathing, no shortness of breath  CV: no chest pain, no Peripheral vascular disease  HEME: No coagulation disorder, no Bleeding disorder  NEURO: no TIA or stroke-like symptoms  SKIN: No new rashes in the head and neck, no recent skin cancers  MOUTH: No new ulcers, no recent teeth infections  GASTROINTESTINAL: No diarrhea, stomach pain  PSYCH: No anxiety, no depression      PHYSICAL EXAM  BP (!) 166/102 (Site: Left Upper Arm, Position: Sitting, Cuff Size: Medium Adult)   Pulse 90   Temp 97.2 °F (36.2 °C) (Temporal)   Ht 5' 5\" (1.651 m)   Wt 268 lb (121.6 kg)   BMI 44.60 kg/m²     GENERAL: No Acute Distress, Alert and Oriented, no Hoarseness, strong voice  EYES: EOMI, Anti-icteric  HENT:   Head: Normocephalic and atraumatic. Face:  Symmetric, facial nerve intact, no sinus tenderness  Right Ear: Normal external ear, normal external auditory canal, intact tympanic membrane with normal mobility and aerated middle ear  Left Ear: Normal external ear, normal external auditory canal, intact tympanic membrane with normal mobility and aerated middle ear  Mouth/Oral Cavity:  normal lips, Uvula is midline, no mucosal lesions, no trismus, normal dentition, normal salivary quality/flow  Oropharynx/Larynx:  normal oropharynx, normal tonsils; normal larynx/nasopharynx on mirror exam  Nose:Normal external nasal appearance.   See below  NECK: Normal range of motion, no thyromegaly, trachea is midline, no lymphadenopathy, no neck masses, no crepitus  CHEST: Normal respiratory effort, no retractions, breathing comfortably  SKIN: No rashes, normal appearing skin, no evidence of skin lesions/tumors  Neuro:  cranial nerve II-XII intact; normal gait  Cardio:  no edema      PROCEDURE  Nasal endoscopy  After lidocaine were applied the bilateral nasal cavity  On the left side she had a relatively large turbinate. More posteriorly I did not appreciate any ralph purulent drainage or polyps. Nasopharynx was normal.    The right side she had a caudal rightward septal deviation. She has some superficial vessels that appear to have recently bled. More posteriorly I do not appreciate any polyps or purulent drainage. ASSESSMENT/PLAN  1. Chronic sinusitis, unspecified location  I am not entirely sure if patient has chronic sinusitis or just mostly allergy symptoms. I would to give her a 14-day course of doxycycline. I have asked her to use the Flonase daily. Also want her to use nasal saline rinses to keep things moist and to help with the bleeding. I would like to see her after the course of antibiotics to see whether or not she feels as though it helped. Depending on how she responded to this, we may evaluate with a CT scan. 2. Epistaxis  On the right side likely secondary the septal deviation as it is drying out and bleeding. Explained to her not to aim the Flonase towards the septum. The nasal saline will also help keep things moist.  I can cauterize the area that is bleeding if it becomes an ongoing issue    3. Allergic rhinitis, unspecified seasonality, unspecified trigger  As above    4. Nasal congestion  Partly secondary to allergic rhinitis. Also has a septal deviation and large turbinates. We will see how the medications help. May benefit from a septoplasty and turbinate reduction. 5. Deviated septum  As above             I have performed a head and neck physical exam personally or was physically present during the key or critical portions of the service. Medical Decision Making:   The following items were considered in medical decision making:  Independent review of images  Review / order clinical lab tests  Review / order radiology tests  Decision to obtain old records

## 2020-10-30 ENCOUNTER — TELEPHONE (OUTPATIENT)
Dept: FAMILY MEDICINE CLINIC | Age: 25
End: 2020-10-30

## 2020-10-30 ENCOUNTER — VIRTUAL VISIT (OUTPATIENT)
Dept: FAMILY MEDICINE CLINIC | Age: 25
End: 2020-10-30
Payer: COMMERCIAL

## 2020-10-30 PROCEDURE — 99213 OFFICE O/P EST LOW 20 MIN: CPT | Performed by: FAMILY MEDICINE

## 2020-10-30 NOTE — PROGRESS NOTES
10/30/2020    TELEHEALTH EVALUATION -- Audio/Visual (During TSQPL-87 public health emergency)    HPI:    Jennifer Colin (:  1995) has requested an audio/video evaluation for the following concern(s):    Patient presenting with 24 hours of fever, dry cough, rhinitis, nausea, vomiting, diarrhea, and headache. Patient states she had a recent exposure to Covid so she went and got tested the day after the exposure. Covid test came back negative.    5 days ago, her cousin spent all day long with her, plus slept in the same bed with her that evening. Cousin became ill the next day with fever and cough. Cousin then tested positive for Covid. Patient states her  also now has fever and respiratory symptoms as well. Works at [employer]. Patient is a . Review of Systems  See hpi    Prior to Visit Medications    Medication Sig Taking?  Authorizing Provider   fluticasone (FLONASE) 50 MCG/ACT nasal spray 1 spray by Each Nostril route daily Yes Abhishek Back MD   fluticasone-salmeterol (ADVAIR) 250-50 MCG/DOSE AEPB Inhale 1 puff into the lungs every 12 hours Yes Kelly John MD   albuterol sulfate HFA (PROAIR HFA) 108 (90 Base) MCG/ACT inhaler Inhale 2 puffs into the lungs every 6 hours as needed for Wheezing Yes Kelly John MD   etonogestrel (Eulas Sontag) 68 MG implant  Yes Historical Provider, MD   FLUoxetine (PROZAC) 10 MG capsule Take 40 mg by mouth daily  Yes Historical Provider, MD   fluconazole (DIFLUCAN) 150 MG tablet Take 1 tablet by mouth daily  Patient not taking: Reported on 10/30/2020  Cristian Diop MD   oxymetazoline (12 HOUR NASAL SPRAY) 0.05 % nasal spray 2 sprays by Nasal route 2 times daily  Patient not taking: Reported on 10/30/2020  Kelly John MD   Hyoscyamine Sulfate SL (LEVSIN/SL) 0.125 MG SUBL 1 po up to qid for abdominal pain  Patient not taking: Reported on 10/30/2020  Kelly John MD   tiZANidine (ZANAFLEX) 4 MG tablet 1 po up to tid prn neck pain. Do not use with alcohol or drive  Patient not taking: Reported on 10/30/2020  Tracie Diaz MD   omeprazole (PRILOSEC) 20 MG delayed release capsule 1 po q day x 14 days  Patient not taking: Reported on 6/16/2020  Tracie Diaz MD   SUMAtriptan (IMITREX) 50 MG tablet Take 1 tablet by mouth once as needed for Migraine I table po once a day as needed for migraine. Not a daily med  Tracie Diaz MD       Social History     Tobacco Use    Smoking status: Never Smoker    Smokeless tobacco: Never Used   Substance Use Topics    Alcohol use: Yes     Comment: month Infantium    Drug use: No        Allergies   Allergen Reactions    Latex     Hibiscus Extract Other (See Comments) and Swelling    Other Hives     Perfumes cause migraines, causes asthma attack   ,   Past Medical History:   Diagnosis Date    Asthma     Depression     Heavy menses 4/23/2019    Iron deficiency 4/23/2019    OCD (obsessive compulsive disorder)        PHYSICAL EXAMINATION:  [ INSTRUCTIONS:  \"[x]\" Indicates a positive item  \"[]\" Indicates a negative item  -- DELETE ALL ITEMS NOT EXAMINED]  Vital Signs: (As obtained by patient/caregiver or practitioner observation)    Patient-Reported Vitals 10/30/2020   Patient-Reported Weight 250   Patient-Reported Height 5 5   Patient-Reported Temperature 98.9        Constitutional: [x] Appears well-developed and well-nourished [x] No apparent distress      [] Abnormal-   Mental status  [x] Alert and awake  [x] Oriented to person/place/time []Able to follow commands      Eyes:  EOM    []  Normal  [] Abnormal-  Sclera  []  Normal  [] Abnormal -         Discharge [x]  None visible  [] Abnormal -    HENT:   [x] Normocephalic, atraumatic.   [] Abnormal   [] Mouth/Throat: Mucous membranes are moist.     External Ears [x] Normal  [] Abnormal-     Neck: [x] No visualized mass     Pulmonary/Chest: [x] Respiratory effort normal.  [x] No visualized signs of difficulty conducted with patient's (and/or legal guardian's) consent, to reduce the patient's risk of exposure to COVID-19 and provide necessary medical care. The patient (and/or legal guardian) has also been advised to contact this office for worsening conditions or problems, and seek emergency medical treatment and/or call 911 if deemed necessary. Patient identification was verified at the start of the visit: Yes    Total time spent on this encounter: Not billed by time    Services were provided through a video synchronous discussion virtually to substitute for in-person clinic visit. Patient and provider were located at their individual homes. --Devin Persaud MD on 10/30/2020 at 3:41 PM    An electronic signature was used to authenticate this note.

## 2020-12-23 ENCOUNTER — VIRTUAL VISIT (OUTPATIENT)
Dept: FAMILY MEDICINE CLINIC | Age: 25
End: 2020-12-23
Payer: COMMERCIAL

## 2020-12-23 ENCOUNTER — TELEPHONE (OUTPATIENT)
Dept: FAMILY MEDICINE CLINIC | Age: 25
End: 2020-12-23

## 2020-12-23 PROCEDURE — 99213 OFFICE O/P EST LOW 20 MIN: CPT | Performed by: INTERNAL MEDICINE

## 2020-12-23 RX ORDER — DOXYCYCLINE HYCLATE 100 MG
100 TABLET ORAL 2 TIMES DAILY
Qty: 28 TABLET | Refills: 0 | Status: SHIPPED | OUTPATIENT
Start: 2020-12-23 | End: 2021-01-06

## 2020-12-23 RX ORDER — FLUCONAZOLE 150 MG/1
150 TABLET ORAL ONCE
Qty: 1 TABLET | Refills: 0 | Status: SHIPPED | OUTPATIENT
Start: 2020-12-23 | End: 2020-12-23

## 2020-12-23 RX ORDER — NORGESTIMATE AND ETHINYL ESTRADIOL 7DAYSX3 28
KIT ORAL
COMMUNITY
Start: 2020-10-17 | End: 2022-07-19

## 2020-12-23 NOTE — PROGRESS NOTES
The below patient isbeing evaluated by a Virtual Visit (video visit) encounter to address concerns as mentioned above. A caregiver was present when appropriate. Due to this being a TeleHealth encounter (During YNYQW-49 public health emergency), evaluation of the following organ systems was limited: Vitals/Constitutional/EENT/Resp/CV/GI//MS/Neuro/Skin/Heme-Lymph-Imm. Pursuant to the emergency declaration under the [address] [place] [address] authority and the [employer] Act, this Virtual Visit was conducted with patient's (and/or legal guardian's) consent, to reduce the patient's risk of exposure to COVID-19 and provide necessary medical care. The patient (and/or legal guardian) has also been advised to contact this office for worsening conditions or problems, and seek emergency medical treatment and/or call 911 if deemed necessary. Patient identification was verified at the start of the visit: Yes    Total time spent on this encounter: Not billed by time    Services were provided through a video synchronous discussion virtually to substitute for in-person clinic visit. Patient and provider were located at their individual homes. --Nanci Santos MD on 12/23/2020 at 4:52 PM    An electronic signature was used to authenticate this note.       HPI: Ephraim Layman presents for evaluation and management of ear pain and congestion, epistaxis.       Chronic health issues include improved migraines, elevated BMI, dyspnea, acne, obesity, recurrent abdominal pain recurrent sinusitis History of recurrent sinusitis. Has seen ENT for epistaxis. Was to be treated for chronic sinusitis with Flonase and tetracycline. She never filled her doxycycline. Stated she had hypertrophied turbinates and septal deviation that surgery might be advantageous in the future. She has had 3 episodes of sinus symptoms since then. Nosebleeds have improved with her Flonase. Was tested positive for Covid in November. The symptoms began 1 day ago. Now with nosebleeds, stuffiness, cheek pain. No fevers. No increased chest tightness or cough. Denies any GI symptoms. No sweats. No shortness of breath with exertion. Took Advil Cold and Sinus. Significant acne. Upcoming dermatology appointment 28 January. Cystic lesions        Hx asthma. Triggers pollen, purfume, colds, proair daily x 3 week. Nocturnal cough. Denies heart burnno eczemia. .     Elevated BMI. Has tried low-carb diet, exercising, positive somnolence. Rare knee pain, rare GE reflux however positive hoarseness.   Occasional wheeze no known snoring       Currently on Prozac 40 mg.       History of recurrent abdominal and CT scans.  Most recent 2019 with abdominal discomfort.  Liver, gallbladder, pancreas, spleen, adrenals, kidneys, and GI tract were unremarkable were no stones.  CT scan for abdominal pain May 2019 with similar findings.  There was no evidence of stones.  Seen GI with upper and lower endoscopy which were negative.  Has history of migraines.  Has been given Bentyl and Pamelor in the past.  Not using now.  Onset of pain in the upper abdomen bilaterally last evening while watching moving him and couple of drinks.  Positive nausea and gagging.  Said there was some bile.  No stool in 24 hours.  Had a Parmesan chicken and a solid hour prior to coming in.  No fevers or chills.  No one else is ill.  Recently graduated.  Looking for employment.  Has any increased stressors.  Migraines doing better.  States she did not drink excessively.  Has not had difficulty with alcohol in the past.  Needs a note.  Has an IUD.  No known endometriosis.     History back and arm discomfort this is resolved.  Did not have MRA or CTA suggested by neurology.  Marine Harrison  Evaluation for Jacqualin Quarto with negative EGD and colonoscopy 2019.     Cystic acne improved with medicine however did not get refill ago up to the dermatology appointment. Using topicals. Interested in having medicine again. Did have some improvement. Noted adverse effect.     Migraines improved intolerant Topamax. Family history. One a month. Pleased with how she is doing. .         implant          PMH:    Migraines     SH heterosexual. Degree in  childhood education.   Early unemployed.     FH: 1 brother 1 sister    + MGM breast cancer 52's, migraines. MI 60's,depression alcohol, obesity.  Sylwia Lisandro- HTN     ROS: No syncope. Positive sinus issues, acne, snoring no apnea. Occasional wheeze. No chest pain palpitations or syncope. No breast concerns. Rare GE reflux. Recent constipation. Had had urinary tract infections in the past. No menstrual concerns on birth control pills. No concern with STD.   Constitutional, ent, CV, respiratory, GI, , joint, skin, allergic and psychiatric ROS reviewed and negative except for above    Allergies   Allergen Reactions    Latex     Hibiscus Extract Other (See Comments) and Swelling    Other Hives     Perfumes cause migraines, causes asthma attack       Outpatient Medications Marked as Taking for the 12/23/20 encounter (Virtual Visit) with Catrina Jennings MD   Medication Sig Dispense Refill    TRI-SPRINTEC 0.18/0.215/0.25 MG-35 MCG TABS       fluticasone (FLONASE) 50 MCG/ACT nasal spray 1 spray by Each Nostril route daily 2 Bottle 1    fluticasone-salmeterol (ADVAIR) 250-50 MCG/DOSE AEPB Inhale 1 puff into the lungs every 12 hours 60 each 1    albuterol sulfate HFA (PROAIR HFA) 108 (90 Base) MCG/ACT inhaler Inhale 2 puffs into the lungs every 6 hours as needed for Wheezing 1 Inhaler 3    FLUoxetine (PROZAC) 10 MG capsule Take 40 mg by mouth daily                Past Medical History:   Diagnosis Date    Asthma     Depression     Heavy menses 4/23/2019    Iron deficiency 4/23/2019    OCD (obsessive compulsive disorder)        Past Surgical History:   Procedure Laterality Date    COLONOSCOPY N/A 6/4/2019    COLONOSCOPY WITH BIOPSY performed by Ese Laird MD at [facility] N/A 6/4/2019    EGD BIOPSY performed by Ese Laird MD at [facility] EXTRACTION               Family History   Problem Relation Age of Onset    Mult Sclerosis Mother     Breast Cancer Paternal Grandmother     No Known Problems Father     Depression Sister     No Known Problems Brother 1. Acute recurrent maxillary sinusitis     2. Epistaxis     3. Mood disorder ([address]     4. Mild intermittent asthma without complication     5. Acne vulgaris       Current sinusitis. Afrin Flonase over-the-counter medication nasal sprays. Albuterol if needed. Refill her maintenance inhaler. Covid testing recommended despite her having this once in the past.    Acne. Doxycycline. Mood disorder continue on with her current medications. No follow-ups on file. Diagnosis and treatment discussed.   Possible side effects of medication reviewed  Patients questions answered  Follow up understood  Pt aware if they are not contacted about any test results , this does not mean they are normal.  They should call

## 2020-12-23 NOTE — TELEPHONE ENCOUNTER
PT called in wanting to make a VV. PT says that she normally gets sinus infections this time of year though she saw her ENT and they said she has a deviated septum. PT is complaining however of her eyes hurting, ear pain and nose bleeds along with a stuffy nose.      Please call Pt back if able to be seen: [phone]

## 2020-12-28 ENCOUNTER — TELEPHONE (OUTPATIENT)
Dept: ADMINISTRATIVE | Age: 25
End: 2020-12-28

## 2020-12-28 NOTE — TELEPHONE ENCOUNTER
Submitted PA for Kari Spells 250-50MCG/DOSE aerosol powder  Via CMM Key: [id number]   STATUS:   This medication does not require a prior authorization because it is a covered benefit. .Please notify patient, thank you.

## 2021-01-20 ENCOUNTER — VIRTUAL VISIT (OUTPATIENT)
Dept: FAMILY MEDICINE CLINIC | Age: 26
End: 2021-01-20
Payer: COMMERCIAL

## 2021-01-20 DIAGNOSIS — Z20.822 ENCOUNTER BY TELEHEALTH FOR SUSPECTED COVID-19: Primary | ICD-10-CM

## 2021-01-20 PROCEDURE — 99213 OFFICE O/P EST LOW 20 MIN: CPT | Performed by: INTERNAL MEDICINE

## 2021-01-20 RX ORDER — SUMATRIPTAN 50 MG/1
TABLET, FILM COATED ORAL
Qty: 9 TABLET | Refills: 0 | Status: SHIPPED | OUTPATIENT
Start: 2021-01-20 | End: 2022-07-19

## 2021-01-20 ASSESSMENT — PATIENT HEALTH QUESTIONNAIRE - PHQ9
SUM OF ALL RESPONSES TO PHQ QUESTIONS 1-9: 0
2. FEELING DOWN, DEPRESSED OR HOPELESS: 0
SUM OF ALL RESPONSES TO PHQ QUESTIONS 1-9: 0

## 2021-01-20 NOTE — PROGRESS NOTES
The below patient isbeing evaluated by a Virtual Visit (video visit) encounter to address concerns as mentioned above. A caregiver was present when appropriate. Due to this being a TeleHealth encounter (During CDEDS-52 public health emergency), evaluation of the following organ systems was limited: Vitals/Constitutional/EENT/Resp/CV/GI//MS/Neuro/Skin/Heme-Lymph-Imm. Pursuant to the emergency declaration under the [address] [place] [address], [address] [place] [address] authority and the [employer] Act, this Virtual Visit was conducted with patient's (and/or legal guardian's) consent, to reduce the patient's risk of exposure to COVID-19 and provide necessary medical care. The patient (and/or legal guardian) has also been advised to contact this office for worsening conditions or problems, and seek emergency medical treatment and/or call 911 if deemed necessary. Patient identification was verified at the start of the visit: Yes    Total time spent on this encounter: Not billed by time    Services were provided through a video synchronous discussion virtually to substitute for in-person clinic visit. Patient and provider were located at their individual homes. --Stewart Joshi MD on 1/20/2021 at 3:11 PM    An electronic signature was used to authenticate this note.       HPI: Mayra Tapia presents for illness post Covid exposure.       Chronic health issues include improved migraines, elevated BMI, dyspnea, acne, obesity, recurrent abdominal pain recurrent sinusitis Two students were diagnosed with Covid last week. Today she had the onset of cough diarrhea, sore throat and nasal congestion. Is having some pleuritic pain. Able to drink. Covid testing to be done today. She denies any vomiting. Inhalers have been of benefit. Has history of recurrent sinusitis has Flonase and Afrin at home. Has started. No current decongestant. No hemoptysis. No bloody stools. Has had three stools today. Able to take in fluids. No syncope or presyncope.        History of recurrent sinusitis. Has seen ENT for epistaxis. Was to be treated for chronic sinusitis with Flonase and tetracycline. She never filled her doxycycline. Stated she had hypertrophied turbinates and septal deviation that surgery might be advantageous in the future. She has had 3 episodes of sinus symptoms since then. Nosebleeds have improved with her Flonase.        Hx asthma. Triggers pollen, purfume, colds, proair daily x 3 week. Nocturnal cough. Denies heart burnno eczemia. .      Elevated BMI. Has tried low-carb diet, exercising, positive somnolence. Rare knee pain, rare GE reflux however positive hoarseness.   Occasional wheeze no known snoring       Currently on Prozac 40 mg.       History of recurrent abdominal and CT scans.  Most recent 2019 with abdominal discomfort.  Liver, gallbladder, pancreas, spleen, adrenals, kidneys, and GI tract were unremarkable were no stones.  CT scan for abdominal pain May 2019 with similar findings.  There was no evidence of stones.  Seen GI with upper and lower endoscopy which were negative.  Has history of migraines.  Has been given Bentyl and Pamelor in the past.  Not using now.  Onset of pain in the upper abdomen bilaterally last evening while watching moving him and couple of drinks.  Positive nausea and gagging.  Said there was some bile.  No stool in 24 hours.  Had a Parmesan chicken and a solid hour prior to coming in.  No fevers or chills.  No one else is ill.  Recently graduated.  Looking for employment.  Has any increased stressors.  Migraines doing better.  States she did not drink excessively.  Has not had difficulty with alcohol in the past.  Needs a note.  Has an IUD.  No known endometriosis.     History back and arm discomfort this is resolved.  Did not have MRA or CTA suggested by neurology.  Josephine Aponte  Evaluation for Hanh Joesph with negative EGD and colonoscopy 2019.     Cystic acne improved with medicine however did not get refill ago up to the dermatology appointment. Using topicals. Interested in having medicine again. Did have some improvement. Noted adverse effect.     Migraines improved intolerant Topamax. Family history. One a month. Pleased with how she is doing. .         implant          PMH:    Migraines     SH heterosexual. Degree in  childhood education.   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  MI 60's,depression alcohol, obesity. .     - HTN     ROS: No syncope. Positive sinus issues, acne, snoring no apnea. Occasional wheeze. No chest pain palpitations or syncope. No breast concerns. Rare GE reflux. Recent constipation. Had had urinary tract infections in the past. No menstrual concerns on birth control pills. No concern with STD.     Constitutional, ent, CV, respiratory, GI, , joint, skin, allergic and psychiatric ROS reviewed and negative except for above    Allergies   Allergen Reactions    Latex     Hibiscus Extract Other (See Comments) and Swelling    Other Hives     Perfumes cause migraines, causes asthma attack       Outpatient Medications Marked as Taking for the 1/20/21 encounter (Virtual Visit) with Isabel Lopez MD   Medication Sig Dispense Refill    TRI-SPRINTEC 0.18/0.215/0.25 MG-35 MCG TABS       fluticasone-salmeterol (ADVAIR) 250-50 MCG/DOSE AEPB Inhale 1 puff into the lungs every 12 hours 60 each 1    fluticasone (FLONASE) 50 MCG/ACT nasal spray 1 spray by Each Nostril route daily 2 Bottle 1    albuterol sulfate HFA (PROAIR HFA) 108 (90 Base) MCG/ACT inhaler Inhale 2 puffs into the lungs every 6 hours as needed for Wheezing 1 Inhaler 3    FLUoxetine (PROZAC) 10 MG capsule Take 40 mg by mouth daily                Past Medical History:   Diagnosis Date    Asthma     Depression     Heavy menses 4/23/2019    Iron deficiency 4/23/2019    OCD (obsessive compulsive disorder)        Past Surgical History:   Procedure Laterality Date    COLONOSCOPY N/A 6/4/2019    COLONOSCOPY WITH BIOPSY performed by Hilario Reis MD at [facility] N/A 6/4/2019    EGD BIOPSY performed by Hilario Reis MD at [facility] EXTRACTION               Family History   Problem Relation Age of Onset    Mult Sclerosis Mother     Breast Cancer Paternal Grandmother     No Known Problems Father     Depression Sister     No Known Problems Brother  No Known Problems Maternal Aunt     No Known Problems Maternal Uncle     No Known Problems Paternal Aunt     No Known Problems Paternal Uncle     No Known Problems Maternal Grandmother     No Known Problems Maternal Grandfather     No Known Problems Paternal Grandfather     No Known Problems Other     Anesth Problems Neg Hx     Broken Bones Neg Hx     Cancer Neg Hx     Clotting Disorder Neg Hx     Collagen Disease Neg Hx     Diabetes Neg Hx     Dislocations Neg Hx     Osteoporosis Neg Hx     Rheumatologic Disease Neg Hx     Scoliosis Neg Hx     Severe Sprains Neg Hx          Review of Systems      Chemistry        Component Value Date/Time     01/27/2020 1458    K 3.9 01/27/2020 1458     01/27/2020 1458    CO2 21 01/27/2020 1458    BUN 7 01/27/2020 1458    CREATININE 0.7 01/27/2020 1458        Component Value Date/Time    CALCIUM 9.7 01/27/2020 1458    ALKPHOS 92 01/27/2020 1458    AST 25 01/27/2020 1458    ALT 30 01/27/2020 1458    BILITOT 0.4 01/27/2020 1458            NO vitals  as this was a virtual visit during cvod19 pandemic  She is appropriate. No tachypnea. Normal voice. Good eye contact. Elevated BMI.     Lab Results   Component Value Date    WBC 10.6 06/16/2020    HGB 14.0 06/16/2020    HCT 41.7 06/16/2020    MCV 90.4 06/16/2020     06/16/2020     Lab Results   Component Value Date    LABA1C 5.4 07/26/2018     Lab Results   Component Value Date    .3 07/26/2018     Lab Results   Component Value Date    LABA1C 5.4 07/26/2018     No components found for: CHLPL  Lab Results   Component Value Date    TRIG 174 (H) 07/26/2018     Lab Results   Component Value Date    HDL 68 (H) 07/26/2018     Lab Results   Component Value Date    LDLCALC 120 (H) 07/26/2018     Lab Results   Component Value Date    LABVLDL 35 07/26/2018       Old labs and records reviewed or requested  Discussed past lab and studies with patient      Diagnosis Orders 1. Encounter by telehealth for suspected COVID-19         Will self isolate. Used symptomatic medications. Email me with progress over the week. Knows symptoms prompting hospitalization or ER visit. Note for work    No follow-ups on file. Diagnosis and treatment discussed.   Possible side effects of medication reviewed  Patients questions answered  Follow up understood  Pt aware if they are not contacted about any test results , this does not mean they are normal.  They should call

## 2021-01-20 NOTE — LETTER
[address] [place] [address]  Phone: [phone]  Fax: [phone]    Yuli Fuller MD        January 20, 2021     Patient: Juventino Raya   YOB: 1995   Date of Visit: 1/20/2021       To Whom It May Concern: It is my medical opinion that Shellie Hummel was ween today with illness. She should not return to work prior to [facility]. Is probable that she will not be able to fill her normal work responsibilities during this illness. This includes her online classes    If you have any questions or concerns, please don't hesitate to call.     Sincerely,            Yuli Fuller MD

## 2021-03-29 ENCOUNTER — APPOINTMENT (OUTPATIENT)
Dept: CT IMAGING | Age: 26
End: 2021-03-29
Payer: COMMERCIAL

## 2021-03-29 ENCOUNTER — HOSPITAL ENCOUNTER (EMERGENCY)
Age: 26
Discharge: HOME | End: 2021-03-29
Attending: EMERGENCY MEDICINE
Payer: COMMERCIAL

## 2021-03-29 VITALS
RESPIRATION RATE: 16 BRPM | DIASTOLIC BLOOD PRESSURE: 72 MMHG | TEMPERATURE: 98.4 F | HEART RATE: 88 BPM | SYSTOLIC BLOOD PRESSURE: 149 MMHG | HEIGHT: 65 IN | WEIGHT: 262.79 LBS | OXYGEN SATURATION: 97 % | BODY MASS INDEX: 43.78 KG/M2

## 2021-03-29 DIAGNOSIS — R10.30 LOWER ABDOMINAL PAIN: Primary | ICD-10-CM

## 2021-03-29 LAB
ALBUMIN SERPL-MCNC: 3.7 G/DL (ref 3.4–5)
ALP BLD-CCNC: 76 U/L (ref 40–129)
ALT SERPL-CCNC: 17 U/L (ref 10–40)
ANION GAP SERPL CALCULATED.3IONS-SCNC: 10 MMOL/L (ref 3–16)
AST SERPL-CCNC: 18 U/L (ref 15–37)
BASOPHILS ABSOLUTE: 0.1 K/UL (ref 0–0.2)
BASOPHILS RELATIVE PERCENT: 1.1 %
BILIRUB SERPL-MCNC: <0.2 MG/DL (ref 0–1)
BILIRUBIN DIRECT: <0.2 MG/DL (ref 0–0.3)
BILIRUBIN URINE: NEGATIVE
BILIRUBIN, INDIRECT: NORMAL MG/DL (ref 0–1)
BLOOD, URINE: NEGATIVE
BUN BLDV-MCNC: 10 MG/DL (ref 7–20)
CALCIUM SERPL-MCNC: 9 MG/DL (ref 8.3–10.6)
CHLORIDE BLD-SCNC: 99 MMOL/L (ref 99–110)
CLARITY: CLEAR
CO2: 26 MMOL/L (ref 21–32)
COLOR: YELLOW
CREAT SERPL-MCNC: 0.5 MG/DL (ref 0.6–1.1)
EOSINOPHILS ABSOLUTE: 0.6 K/UL (ref 0–0.6)
EOSINOPHILS RELATIVE PERCENT: 4.4 %
GFR AFRICAN AMERICAN: >60
GFR NON-AFRICAN AMERICAN: >60
GLUCOSE BLD-MCNC: 93 MG/DL (ref 70–99)
GLUCOSE URINE: NEGATIVE MG/DL
HCG QUALITATIVE: NEGATIVE
HCT VFR BLD CALC: 39.6 % (ref 36–48)
HEMOGLOBIN: 13.5 G/DL (ref 12–16)
KETONES, URINE: NEGATIVE MG/DL
LEUKOCYTE ESTERASE, URINE: NEGATIVE
LIPASE: 30 U/L (ref 13–60)
LYMPHOCYTES ABSOLUTE: 3.2 K/UL (ref 1–5.1)
LYMPHOCYTES RELATIVE PERCENT: 25.2 %
MCH RBC QN AUTO: 29.4 PG (ref 26–34)
MCHC RBC AUTO-ENTMCNC: 34.2 G/DL (ref 31–36)
MCV RBC AUTO: 86.2 FL (ref 80–100)
MICROSCOPIC EXAMINATION: NORMAL
MONOCYTES ABSOLUTE: 1.1 K/UL (ref 0–1.3)
MONOCYTES RELATIVE PERCENT: 8.5 %
NEUTROPHILS ABSOLUTE: 7.8 K/UL (ref 1.7–7.7)
NEUTROPHILS RELATIVE PERCENT: 60.8 %
NITRITE, URINE: NEGATIVE
PDW BLD-RTO: 13.7 % (ref 12.4–15.4)
PH UA: 7 (ref 5–8)
PLATELET # BLD: 309 K/UL (ref 135–450)
PMV BLD AUTO: 7.7 FL (ref 5–10.5)
POTASSIUM REFLEX MAGNESIUM: 3.6 MMOL/L (ref 3.5–5.1)
PROTEIN UA: NEGATIVE MG/DL
RBC # BLD: 4.6 M/UL (ref 4–5.2)
SODIUM BLD-SCNC: 135 MMOL/L (ref 136–145)
SPECIFIC GRAVITY UA: 1.01 (ref 1–1.03)
TOTAL PROTEIN: 7.1 G/DL (ref 6.4–8.2)
URINE REFLEX TO CULTURE: NORMAL
URINE TYPE: NORMAL
UROBILINOGEN, URINE: 0.2 E.U./DL
WBC # BLD: 12.8 K/UL (ref 4–11)

## 2021-03-29 PROCEDURE — 2580000003 HC RX 258: Performed by: EMERGENCY MEDICINE

## 2021-03-29 PROCEDURE — 96375 TX/PRO/DX INJ NEW DRUG ADDON: CPT

## 2021-03-29 PROCEDURE — 84703 CHORIONIC GONADOTROPIN ASSAY: CPT

## 2021-03-29 PROCEDURE — 6360000002 HC RX W HCPCS: Performed by: EMERGENCY MEDICINE

## 2021-03-29 PROCEDURE — 99284 EMERGENCY DEPT VISIT MOD MDM: CPT

## 2021-03-29 PROCEDURE — 85025 COMPLETE CBC W/AUTO DIFF WBC: CPT

## 2021-03-29 PROCEDURE — 74177 CT ABD & PELVIS W/CONTRAST: CPT

## 2021-03-29 PROCEDURE — 36415 COLL VENOUS BLD VENIPUNCTURE: CPT

## 2021-03-29 PROCEDURE — 80076 HEPATIC FUNCTION PANEL: CPT

## 2021-03-29 PROCEDURE — 6360000004 HC RX CONTRAST MEDICATION: Performed by: EMERGENCY MEDICINE

## 2021-03-29 PROCEDURE — 81003 URINALYSIS AUTO W/O SCOPE: CPT

## 2021-03-29 PROCEDURE — 80048 BASIC METABOLIC PNL TOTAL CA: CPT

## 2021-03-29 PROCEDURE — 83690 ASSAY OF LIPASE: CPT

## 2021-03-29 PROCEDURE — 96374 THER/PROPH/DIAG INJ IV PUSH: CPT

## 2021-03-29 RX ORDER — SPIRONOLACTONE 50 MG/1
TABLET, FILM COATED ORAL
COMMUNITY
Start: 2020-12-28

## 2021-03-29 RX ORDER — KETOROLAC TROMETHAMINE 30 MG/ML
30 INJECTION, SOLUTION INTRAMUSCULAR; INTRAVENOUS ONCE
Status: COMPLETED | OUTPATIENT
Start: 2021-03-29 | End: 2021-03-29

## 2021-03-29 RX ORDER — METHOCARBAMOL 500 MG/1
500 TABLET, FILM COATED ORAL 2 TIMES DAILY PRN
Qty: 10 TABLET | Refills: 0 | Status: SHIPPED | OUTPATIENT
Start: 2021-03-29 | End: 2021-04-08

## 2021-03-29 RX ORDER — 0.9 % SODIUM CHLORIDE 0.9 %
1000 INTRAVENOUS SOLUTION INTRAVENOUS ONCE
Status: COMPLETED | OUTPATIENT
Start: 2021-03-29 | End: 2021-03-29

## 2021-03-29 RX ORDER — ONDANSETRON 2 MG/ML
4 INJECTION INTRAMUSCULAR; INTRAVENOUS ONCE
Status: COMPLETED | OUTPATIENT
Start: 2021-03-29 | End: 2021-03-29

## 2021-03-29 RX ADMIN — SODIUM CHLORIDE 1000 ML: 9 INJECTION, SOLUTION INTRAVENOUS at 01:45

## 2021-03-29 RX ADMIN — IOPAMIDOL 75 ML: 755 INJECTION, SOLUTION INTRAVENOUS at 02:07

## 2021-03-29 RX ADMIN — ONDANSETRON 4 MG: 2 INJECTION INTRAMUSCULAR; INTRAVENOUS at 02:17

## 2021-03-29 RX ADMIN — KETOROLAC TROMETHAMINE 30 MG: 30 INJECTION, SOLUTION INTRAMUSCULAR at 01:45

## 2021-03-29 ASSESSMENT — ENCOUNTER SYMPTOMS
CONSTIPATION: 0
EYE ITCHING: 0
COLOR CHANGE: 0
VOMITING: 0
COUGH: 0
EYE DISCHARGE: 0
ABDOMINAL PAIN: 1
NAUSEA: 1
SHORTNESS OF BREATH: 0

## 2021-03-29 ASSESSMENT — PAIN DESCRIPTION - LOCATION: LOCATION: GROIN

## 2021-03-29 ASSESSMENT — PAIN SCALES - GENERAL: PAINLEVEL_OUTOF10: 7

## 2021-03-29 NOTE — ED PROVIDER NOTES
noted above the remainder of the review of systems was reviewed and negative. PAST MEDICAL HISTORY     Past Medical History:   Diagnosis Date    Asthma     Depression     Heavy menses 4/23/2019    Iron deficiency 4/23/2019    OCD (obsessive compulsive disorder)        SURGICAL HISTORY       Past Surgical History:   Procedure Laterality Date    COLONOSCOPY N/A 6/4/2019    COLONOSCOPY WITH BIOPSY performed by Denzel Ramirez MD at [address] [place] [address] N/A 6/4/2019    EGD BIOPSY performed by Denzel Ramirez MD at [facility]       Discharge Medication List as of 3/29/2021  2:42 AM      CONTINUE these medications which have NOT CHANGED    Details   spironolactone (ALDACTONE) 50 MG tablet TAKE 2 TABLETS BY MOUTH EVERY MORNING AND 1 TABLET EVERY EVENING AS DIRECTEDHistorical Med      TRI-SPRINTEC 0.18/0.215/0.25 MG-35 MCG TABS DAWHistorical Med      SUMAtriptan (IMITREX) 50 MG tablet I table po once a day as needed for migraine.  Not a daily med, Disp-9 tablet, R-0Normal      fluticasone-salmeterol (ADVAIR) 250-50 MCG/DOSE AEPB Inhale 1 puff into the lungs every 12 hours, Disp-60 each, R-1Normal      fluticasone (FLONASE) 50 MCG/ACT nasal spray 1 spray by Each Nostril route daily, Disp-2 Bottle,R-1Normal      albuterol sulfate HFA (PROAIR HFA) 108 (90 Base) MCG/ACT inhaler Inhale 2 puffs into the lungs every 6 hours as needed for Wheezing, Disp-1 Inhaler, R-3Normal      FLUoxetine (PROZAC) 10 MG capsule Take 40 mg by mouth daily Historical Med      Hyoscyamine Sulfate SL (LEVSIN/SL) 0.125 MG SUBL 1 po up to qid for abdominal pain, Disp-40 each, R-0Normal      omeprazole (PRILOSEC) 20 MG delayed release capsule 1 po q day x 14 days, Disp-14 capsule, R-0Normal             ALLERGIES     Latex, Hibiscus extract, and Other    FAMILY HISTORY        Family History   Problem Relation Age of Onset    Mult Sclerosis Mother     Breast Cancer Paternal Grandmother     No Known Problems Father     Depression Sister     No Known Problems Brother     No Known Problems Maternal Aunt     No Known Problems Maternal Uncle     No Known Problems Paternal Aunt     No Known Problems Paternal Uncle     No Known Problems Maternal Grandmother     No Known Problems Maternal Grandfather     No Known Problems Paternal Grandfather     No Known Problems Other     Anesth Problems Neg Hx     Broken Bones Neg Hx     Cancer Neg Hx     Clotting Disorder Neg Hx     Collagen Disease Neg Hx     Diabetes Neg Hx     Dislocations Neg Hx     Osteoporosis Neg Hx     Rheumatologic Disease Neg Hx     Scoliosis Neg Hx     Severe Sprains Neg Hx        SOCIAL HISTORY       Social History     Socioeconomic History    Marital status: Single     Spouse name: None    Number of children: None    Years of education: None    Highest education level: None   Occupational History    Occupation: Student   Social Needs    Financial resource strain: None    Food insecurity     Worry: None     Inability: None    Transportation needs     Medical: None     Non-medical: None   Tobacco Use    Smoking status: Never Smoker    Smokeless tobacco: Never Used   Substance and Sexual Activity    Alcohol use: Yes     Comment: month whiskey    Drug use: No    Sexual activity: Yes     Partners: Male     Comment: condom   Lifestyle    Physical activity     Days per week: None     Minutes per session: None    Stress: None   Relationships    Social connections     Talks on phone: None     Gets together: None     Attends Restorationism service: None     Active member of club or organization: None     Attends meetings of clubs or organizations: None     Relationship status: None    Intimate partner violence     Fear of current or ex partner: None     Emotionally abused: None     Physically abused: None     Forced sexual activity: None   Other Topics Concern    None Social History Narrative    None       PHYSICAL EXAM       ED Triage Vitals [03/29/21 0104]   BP Temp Temp Source Pulse Resp SpO2 Height Weight   (!) 151/88 98.4 °F (36.9 °C) Oral 96 16 99 % 5' 5\" (1.651 m) 262 lb 12.6 oz (119.2 kg)       Physical Exam  Vitals signs and nursing note reviewed. Constitutional:       General: She is not in acute distress. Appearance: She is well-developed. She is not ill-appearing, toxic-appearing or diaphoretic. HENT:      Head: Normocephalic and atraumatic. Right Ear: External ear normal.      Left Ear: External ear normal.   Eyes:      General:         Right eye: No discharge. Left eye: No discharge. Conjunctiva/sclera: Conjunctivae normal.      Pupils: Pupils are equal, round, and reactive to light. Neck:      Musculoskeletal: Normal range of motion and neck supple. Cardiovascular:      Rate and Rhythm: Normal rate and regular rhythm. Heart sounds: No murmur. Pulmonary:      Effort: Pulmonary effort is normal. No respiratory distress. Breath sounds: Normal breath sounds. No wheezing or rales. Abdominal:      General: Bowel sounds are normal. There is no distension. Palpations: Abdomen is soft. There is no mass. Tenderness: There is abdominal tenderness. There is no guarding or rebound. Comments: No inguinal hernia noted. Pain is in the right lower quadrant. Genitourinary:     Comments: Deferred  Musculoskeletal: Normal range of motion. General: No deformity. Skin:     General: Skin is warm. Findings: No erythema or rash. Neurological:      Mental Status: She is alert and oriented to person, place, and time. She is not disoriented. Cranial Nerves: No cranial nerve deficit. Motor: No atrophy or abnormal muscle tone. Coordination: Coordination normal.   Psychiatric:         Behavior: Behavior normal.         Thought Content: Thought content normal.         DIAGNOSTIC RESULTS     EKG:  All EKG's are interpreted by the Emergency Department Physician who either signs or Co-signs this chart in the absence of acardiologist.    None    RADIOLOGY:   Non-plain film images such as CT, Ultrasoundand MRI are read by the radiologist. Plain radiographic images are visualized and preliminarily interpreted by the emergency physician with the below findings:    Impression   No acute abnormality in the abdomen or pelvis.       Normal appendix.      ED BEDSIDE ULTRASOUND:   Performed by ED Physician - none    LABS:  Labs Reviewed   CBC WITH AUTO DIFFERENTIAL - Abnormal; Notable for the following components:       Result Value    WBC 12.8 (*)     Neutrophils Absolute 7.8 (*)     All other components within normal limits    Narrative:     Performed at:  [place] [address], [facility] [address]   Phone [phone]   BASIC METABOLIC PANEL W/ REFLEX TO MG FOR LOW K - Abnormal; Notable for the following components:    Sodium 135 (*)     CREATININE 0.5 (*)     All other components within normal limits    Narrative:     Performed at:  [facility]  [address], [facility] [place] [address]   Phone [phone]   URINE RT REFLEX TO CULTURE    Narrative:     Performed at:  [facility]  [address], [facility] [place] [address]   Phone [phone]   LIPASE    Narrative:     Performed at:  [facility]  [address] [place] [address] [address]   Phone [phone]   HEPATIC FUNCTION PANEL    Narrative:     Performed at:  [facility]  [address] [place] [address] [facility] [place] [address]   Phone [phone]   HCG, SERUM, QUALITATIVE    Narrative:     Performed at:  [facility]  [address], [facility] [place] [address]   Phone [phone]       All other labs were withinnormal range or not returned as of this dictation. EMERGENCY DEPARTMENT COURSE and DIFFERENTIAL DIAGNOSIS/MDM:     PMH, Surgical Hx, FH, Social Hx reviewed by myself (ETOH usage, Tobacco usage, Drug usage reviewed by myself, no pertinent Hx)- No Pertinent Hx     Old records were reviewed by me    No identifiable hernia noted. Appendix appears normal.  Patient's pain is well controlled. Pain is actually worse with movement and palpation. My suspicion is possibly muscular in origin. However I discussed with her that she needs close PCP follow-up. I estimate there is LOW risk for Sepsis, MI, Stroke, Tamponade, PTX, Toxicity or other life threatening etiology thus I consider the discharge disposition reasonable. The patient is at low risk for mortality based on demographic, history and clinical factors. Given the best available information and clinical assessment, I estimate the risk of hospitalization to be greater than risk of treatment at home. I have explained to the patient that the risk could rapidly change, given precautions for return and instructions. Explained to patient that the risk for mortality is low based on demographic, history and clinical factors. I discussed with patient the results of evaluation in the ED, diagnosis, care, and prognosis. The plan is to discharge to home. Patient is in agreement with plan and questions have been answered. I also discussed with patient the reasons which may require a return visit and the importance of follow-up care. The patient is well-appearing, nontoxic, and improved at the time of discharge. Patient agrees to call to arrange follow-up care as directed. Patient understands to return immediately for worsening/change in symptoms. CRITICAL CARE TIME   Total Critical Caretime was 21 minutes, excluding separately reportable procedures.   There was a high probability of clinically significant/life threatening deterioration in the patient's condition which required my urgent intervention. PROCEDURES:  Unlessotherwise noted below, none    FINAL IMPRESSION      1.  Lower abdominal pain          DISPOSITION/PLAN   DISPOSITION Decision To Discharge 03/29/2021 02:37:08 AM    PATIENT REFERRED TO:  Sully Han MD  [place] [address]  [phone]    Call today        DISCHARGE MEDICATIONS:  Discharge Medication List as of 3/29/2021  2:42 AM      START taking these medications    Details   methocarbamol (ROBAXIN) 500 MG tablet Take 1 tablet by mouth 2 times daily as needed (Muscle pain), Disp-10 tablet, R-0Print                (Please note that portions ofthis note were completed with a voice recognition program.  Efforts were made to edit the dictations but occasionally words are mis-transcribed.)    Deepak Workman MD(electronically signed)  Attending Emergency Physician        [facility], MD  03/29/21 2526

## 2021-03-29 NOTE — ED NOTES
MD present to discuss testing results and d/c follow up plan of care.       Gurpreet Sykes, ANGELICA  03/29/21 4052

## 2021-05-25 ENCOUNTER — TELEPHONE (OUTPATIENT)
Dept: FAMILY MEDICINE CLINIC | Age: 26
End: 2021-05-25

## 2021-05-25 RX ORDER — PREDNISONE 20 MG/1
20 TABLET ORAL 2 TIMES DAILY
Qty: 10 TABLET | Refills: 0 | Status: SHIPPED | OUTPATIENT
Start: 2021-05-25 | End: 2021-05-30

## 2021-05-25 NOTE — TELEPHONE ENCOUNTER
Did they do covid test?  I recommend this, decongestant, afrin nose spray  Can order a course of steroids as well.   Any fever

## 2021-05-25 NOTE — TELEPHONE ENCOUNTER
Called earlier for appt, was advised to go to [facility], which she stated she did. Was informed she has fluid in her ear, and she has URI. Pt states she was not Rx any meds. Her batchleorette party is this weekend, and she is having difficulty breathing with uri symptoms. Wanting to know if will call something in for her. Please advise. please call into [facility] pharm.   Pt is reachable at  [phone]

## 2021-05-25 NOTE — TELEPHONE ENCOUNTER
Patient informed, states they did not do covid test and wanted you to know she is fully vaccinated. She says the highest temp she had was 99.8. she would like steroid sent to [facility]. Pharmacy confirmed.

## 2021-05-25 NOTE — TELEPHONE ENCOUNTER
Conversation: URI  (Oldest Message First)           5/25/21 3:34 PM  Brionna Brenner contacted [facility]     [address]    5/25/21 3:36 PM  Note     Called earlier for appt, was advised to go to [facility], which she stated she did. Was informed she has fluid in her ear, and she has URI. Pt states she was not Rx any meds. Her batchleorette party is this weekend, and she is having difficulty breathing with uri symptoms. Wanting to know if will call something in for her. Please advise. please call into [facility] pharm. Pt is reachable at  [phone]               5/25/21 3:36 PM  Goyo Ramirez routed this conversation to [facility]  Practice Staff        CE    5/25/21 3:38 PM  Hank Crow MA routed this conversation to MD Silvia Orourke MD  to [facility] Practice Staff     RF    5/25/21 4:02 PM  Note     Did they do covid test?  I recommend this, decongestant, afrin nose spray  Can order a course of steroids as well. Any fever      Me     BR    5/25/21 4:08 PM  Note     Patient informed, states they did not do covid test and wanted you to know she is fully vaccinated. She says the highest temp she had was 99.8. she would like steroid sent to [facility].  Pharmacy confirmed

## 2021-09-03 ENCOUNTER — TELEPHONE (OUTPATIENT)
Dept: FAMILY MEDICINE CLINIC | Age: 26
End: 2021-09-03

## 2021-09-03 NOTE — TELEPHONE ENCOUNTER
Can see 1140 or noon. Come up asap.  Very unlikely that it is still inside of her - she should be able to do a finger sweep to remove it but if she cannot then I can evaluate

## 2021-09-03 NOTE — TELEPHONE ENCOUNTER
PT called in stating that last night as she was trying to remove her tampon that it broke in half and part of it is still lodged inside her and she is not able to pull it out herself. PT called her gynecologist office and they were not able to get her in for an appointment today so they recommended that she call our office.      Please call PT back: [phone]

## 2021-10-01 ENCOUNTER — TELEPHONE (OUTPATIENT)
Dept: FAMILY MEDICINE CLINIC | Age: 26
End: 2021-10-01

## 2021-10-01 NOTE — TELEPHONE ENCOUNTER
Pt called stating she does have asthma and it is hurting when she breaths was wanting to be seen today if possible.  Pt reachable at [phone]

## 2021-11-12 PROBLEM — F10.929 ALCOHOL INTOXICATION (HCC): Status: ACTIVE | Noted: 2021-11-12

## 2021-12-14 ENCOUNTER — CARE COORDINATION (OUTPATIENT)
Dept: OTHER | Facility: CLINIC | Age: 26
End: 2021-12-14

## 2021-12-14 NOTE — CARE COORDINATION
ACC: Aleksandr Muller LPN CM Risk Score: 5  Charlson 10 Year Mortality Risk Score: 4%       ACM attempted to reach patient for follow up call regarding initial HOPR cc out reach. HIPAA compliant message left requesting a return phone call at patients convenience. Will continue to follow. Yoly Frank, [address] Coordinator  Associate Care Management  [address] [place] [address], [address] [place] [address] Street  Phone: [phone]  [email or web address]

## 2021-12-16 ENCOUNTER — CARE COORDINATION (OUTPATIENT)
Dept: OTHER | Facility: CLINIC | Age: 26
End: 2021-12-16

## 2021-12-16 NOTE — CARE COORDINATION
ACM attempted to reach patient for follow up call regarding ACM intro. HIPAA compliant message left requesting a return phone call at patients convenience. Will continue to follow. Yoly Palafox, [address] Coordinator  Associate Care Management  [address] [place] [address]  Phone: [phone]  [email or web address]

## 2021-12-29 ENCOUNTER — CARE COORDINATION (OUTPATIENT)
Dept: OTHER | Facility: CLINIC | Age: 26
End: 2021-12-29

## 2022-02-07 ENCOUNTER — TELEPHONE (OUTPATIENT)
Dept: FAMILY MEDICINE CLINIC | Age: 27
End: 2022-02-07

## 2022-02-07 ENCOUNTER — E-VISIT (OUTPATIENT)
Dept: FAMILY MEDICINE CLINIC | Age: 27
End: 2022-02-07
Payer: COMMERCIAL

## 2022-02-07 DIAGNOSIS — J01.90 ACUTE SINUSITIS, RECURRENCE NOT SPECIFIED, UNSPECIFIED LOCATION: Primary | ICD-10-CM

## 2022-02-07 PROCEDURE — 99441 PR PHYS/QHP TELEPHONE EVALUATION 5-10 MIN: CPT | Performed by: INTERNAL MEDICINE

## 2022-02-07 RX ORDER — AZITHROMYCIN 250 MG/1
250 TABLET, FILM COATED ORAL SEE ADMIN INSTRUCTIONS
Qty: 6 TABLET | Refills: 0 | Status: SHIPPED | OUTPATIENT
Start: 2022-02-07 | End: 2022-02-12

## 2022-02-07 ASSESSMENT — LIFESTYLE VARIABLES: SMOKING_STATUS: NO, I'VE NEVER SMOKED

## 2022-02-07 NOTE — TELEPHONE ENCOUNTER
Spoke with pt she stated that she has a covid test schedule today at 12:30 at [facility] what time can  you do vv today

## 2022-02-07 NOTE — TELEPHONE ENCOUNTER
Pt is calling because she is having some cold symptoms. She is asking for an appt. Please advise.      Onset- thurs    Symptoms  Sore throat  Sinus pressure  Ear pain  Dry cough  Sneezing  Low grade-100.4    otc- coraceden, mucinex, flonase    covid exposure- not sure    covid-yes 3 shots  Flu shot- yes

## 2022-02-07 NOTE — TELEPHONE ENCOUNTER
Patient lives in [facility] unable to do VV. Message sent to patient to complete questions from E VISIT.

## 2022-05-15 RX ORDER — ALBUTEROL SULFATE 90 UG/1
2 AEROSOL, METERED RESPIRATORY (INHALATION) EVERY 6 HOURS PRN
Qty: 1 EACH | Refills: 2 | Status: SHIPPED | OUTPATIENT
Start: 2022-05-15
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
## 2022-06-29 ENCOUNTER — PATIENT MESSAGE (OUTPATIENT)
Dept: FAMILY MEDICINE CLINIC | Age: 27
End: 2022-06-29

## 2022-06-29 ENCOUNTER — OFFICE VISIT (OUTPATIENT)
Dept: PSYCHOLOGY | Age: 27
End: 2022-06-29
Payer: COMMERCIAL

## 2022-06-29 DIAGNOSIS — F41.1 GAD (GENERALIZED ANXIETY DISORDER): ICD-10-CM

## 2022-06-29 DIAGNOSIS — F43.10 PTSD (POST-TRAUMATIC STRESS DISORDER): Primary | ICD-10-CM

## 2022-06-29 DIAGNOSIS — F39 MOOD DISORDER (HCC): Primary | ICD-10-CM

## 2022-06-29 PROCEDURE — 90791 PSYCH DIAGNOSTIC EVALUATION: CPT | Performed by: SOCIAL WORKER

## 2022-06-29 ASSESSMENT — ANXIETY QUESTIONNAIRES
4. TROUBLE RELAXING: 3
6. BECOMING EASILY ANNOYED OR IRRITABLE: 3
5. BEING SO RESTLESS THAT IT IS HARD TO SIT STILL: 3
IF YOU CHECKED OFF ANY PROBLEMS ON THIS QUESTIONNAIRE, HOW DIFFICULT HAVE THESE PROBLEMS MADE IT FOR YOU TO DO YOUR WORK, TAKE CARE OF THINGS AT HOME, OR GET ALONG WITH OTHER PEOPLE: EXTREMELY DIFFICULT
2. NOT BEING ABLE TO STOP OR CONTROL WORRYING: 3
GAD7 TOTAL SCORE: 21
3. WORRYING TOO MUCH ABOUT DIFFERENT THINGS: 3
7. FEELING AFRAID AS IF SOMETHING AWFUL MIGHT HAPPEN: 3
1. FEELING NERVOUS, ANXIOUS, OR ON EDGE: 3

## 2022-06-29 ASSESSMENT — PATIENT HEALTH QUESTIONNAIRE - PHQ9
4. FEELING TIRED OR HAVING LITTLE ENERGY: 3
9. THOUGHTS THAT YOU WOULD BE BETTER OFF DEAD, OR OF HURTING YOURSELF: 0
1. LITTLE INTEREST OR PLEASURE IN DOING THINGS: 2
SUM OF ALL RESPONSES TO PHQ QUESTIONS 1-9: 15
10. IF YOU CHECKED OFF ANY PROBLEMS, HOW DIFFICULT HAVE THESE PROBLEMS MADE IT FOR YOU TO DO YOUR WORK, TAKE CARE OF THINGS AT HOME, OR GET ALONG WITH OTHER PEOPLE: 1
SUM OF ALL RESPONSES TO PHQ9 QUESTIONS 1 & 2: 3
2. FEELING DOWN, DEPRESSED OR HOPELESS: 1
3. TROUBLE FALLING OR STAYING ASLEEP: 3
5. POOR APPETITE OR OVEREATING: 1
SUM OF ALL RESPONSES TO PHQ QUESTIONS 1-9: 15
6. FEELING BAD ABOUT YOURSELF - OR THAT YOU ARE A FAILURE OR HAVE LET YOURSELF OR YOUR FAMILY DOWN: 1
SUM OF ALL RESPONSES TO PHQ QUESTIONS 1-9: 15
8. MOVING OR SPEAKING SO SLOWLY THAT OTHER PEOPLE COULD HAVE NOTICED. OR THE OPPOSITE, BEING SO FIGETY OR RESTLESS THAT YOU HAVE BEEN MOVING AROUND A LOT MORE THAN USUAL: 1
SUM OF ALL RESPONSES TO PHQ QUESTIONS 1-9: 15
7. TROUBLE CONCENTRATING ON THINGS, SUCH AS READING THE NEWSPAPER OR WATCHING TELEVISION: 3

## 2022-06-29 NOTE — TELEPHONE ENCOUNTER
From: Juancarlos Echols  To: Dr. Chapa [place]: 6/29/2022 1:07 PM EDT  Subject: OCD Medication    Hi Dr. Con Vuong! Genia Gray got my annual physical in a few weeks so I was trying to wait, but I dont think it can. My old psychiatrist retired and the one with your practice is booked until September. I have an appointment set up with her, but Im to the point of rationing my medicine. Im supposed to take 40mg of Prozac a day, but I took it down to 20mg a day then now Im at 20mg a week. Could you possibly fill a prescription of it for me? Also, they had to build me up to 36 originally so do you think Ill have to start off at 20s again? Thanks!

## 2022-06-29 NOTE — Clinical Note
Are you able to prescribe Prosac? Pt is running low on medications. Per her report: Pt has been rationing her dosage. She was prescribed 40 mg daily, then went down to 20 mg/daily, and is currently taking one 20 mg pill once/week. Thanks!

## 2022-06-29 NOTE — PATIENT INSTRUCTIONS
1. Contact Dr. Jose Miguel Stoner regarding medication referral.     2. Psychiatric referrals: 1.[place]. [facility], [phone]   2. [address] [phone]    3. Dr. Ciera Jolley [phone] - ask about sliding scale: Counseling referral    4. Take 2 diaphragmatic breaths every hour for the next several days, then as needed for anxiety/anger. Relaxation:  Diaphragmatic Breathing             ______________________________________________________________________________    1. Sit in a comfortable position    2. Place one hand on your stomach and the other on your chest    3. Try to breathe so that only your stomach rises and falls    As you inhale, concentrate on your chest remaining relatively still while your stomach rises. It may be helpful for you to imagine that your pants are too big and you need to push your stomach out to hold them up. When exhaling, allow your stomach to fall in and the air to fully escape. Inhale slowly. You may choose to hold the air in for about a second. Exhale slowly. Dont push the air out, but just let the natural pressure of your body slowly move it out. It is normal for this healthy method of breathing to feel a little awkward at first.  With practice, it will feel more natural.    4. Get your mind on your side      One other important factor in getting relaxed is your mind. Your mind and body are connected. The mind influences the body and the body influences the mind. What you do with your mind when you are trying to relax is very important. The key is to avoid thinking about stressful things. You can think about       Neutral things (e.g., counting, saying a word like calm or relax)    Pleasant things (e.g., imagining a pleasant place)     5. It is recommended that you practice 2 times per day, 10 minutes each time.

## 2022-06-30 RX ORDER — FLUOXETINE 10 MG/1
40 CAPSULE ORAL DAILY
Qty: 30 CAPSULE | Refills: 2 | Status: SHIPPED | OUTPATIENT
Start: 2022-06-30 | End: 2022-07-08 | Stop reason: SDUPTHER

## 2022-07-08 ENCOUNTER — TELEPHONE (OUTPATIENT)
Dept: FAMILY MEDICINE CLINIC | Age: 27
End: 2022-07-08

## 2022-07-08 DIAGNOSIS — F39 MOOD DISORDER (HCC): ICD-10-CM

## 2022-07-08 RX ORDER — FLUOXETINE 10 MG/1
40 CAPSULE ORAL DAILY
Qty: 120 CAPSULE | Refills: 0 | Status: SHIPPED | OUTPATIENT
Start: 2022-07-08 | End: 2022-08-12 | Stop reason: SDUPTHER

## 2022-07-08 NOTE — TELEPHONE ENCOUNTER
----- Message from Liam Evans, [address] sent at 6/29/2022  2:17 PM EDT -----  Are you able to prescribe Prosac? Pt is running low on medications. Per her report: Pt has been rationing her dosage. She was prescribed 40 mg daily, then went down to 20 mg/daily, and is currently taking one 20 mg pill once/week. Thanks!

## 2022-07-08 NOTE — TELEPHONE ENCOUNTER
Please let patient know fluoxetine prescription sent to [facility]

## 2022-07-19 ENCOUNTER — OFFICE VISIT (OUTPATIENT)
Dept: FAMILY MEDICINE CLINIC | Age: 27
End: 2022-07-19
Payer: COMMERCIAL

## 2022-07-19 VITALS
SYSTOLIC BLOOD PRESSURE: 140 MMHG | HEIGHT: 65 IN | RESPIRATION RATE: 18 BRPM | WEIGHT: 272 LBS | DIASTOLIC BLOOD PRESSURE: 88 MMHG | OXYGEN SATURATION: 99 % | HEART RATE: 99 BPM | BODY MASS INDEX: 45.32 KG/M2

## 2022-07-19 DIAGNOSIS — J45.20 MILD INTERMITTENT ASTHMA WITHOUT COMPLICATION: ICD-10-CM

## 2022-07-19 DIAGNOSIS — G47.9 SLEEP DISORDER: ICD-10-CM

## 2022-07-19 DIAGNOSIS — L70.0 ACNE VULGARIS: ICD-10-CM

## 2022-07-19 DIAGNOSIS — E66.9 OBESITY (BMI 35.0-39.9 WITHOUT COMORBIDITY): ICD-10-CM

## 2022-07-19 DIAGNOSIS — G43.109 MIGRAINE WITH AURA AND WITHOUT STATUS MIGRAINOSUS, NOT INTRACTABLE: ICD-10-CM

## 2022-07-19 DIAGNOSIS — R03.0 ELEVATED BLOOD PRESSURE READING: ICD-10-CM

## 2022-07-19 DIAGNOSIS — F39 MOOD DISORDER (HCC): ICD-10-CM

## 2022-07-19 PROBLEM — N92.0 HEAVY MENSES: Status: RESOLVED | Noted: 2019-04-23 | Resolved: 2022-07-19

## 2022-07-19 PROCEDURE — 99395 PREV VISIT EST AGE 18-39: CPT | Performed by: INTERNAL MEDICINE

## 2022-07-19 RX ORDER — PRAZOSIN HYDROCHLORIDE 1 MG/1
CAPSULE ORAL
Qty: 120 CAPSULE | Refills: 0 | Status: SHIPPED | OUTPATIENT
Start: 2022-07-19 | End: 2022-08-24

## 2022-07-19 ASSESSMENT — PATIENT HEALTH QUESTIONNAIRE - PHQ9
2. FEELING DOWN, DEPRESSED OR HOPELESS: 1
SUM OF ALL RESPONSES TO PHQ QUESTIONS 1-9: 1
1. LITTLE INTEREST OR PLEASURE IN DOING THINGS: 0
SUM OF ALL RESPONSES TO PHQ QUESTIONS 1-9: 1
SUM OF ALL RESPONSES TO PHQ9 QUESTIONS 1 & 2: 1
SUM OF ALL RESPONSES TO PHQ QUESTIONS 1-9: 1
SUM OF ALL RESPONSES TO PHQ QUESTIONS 1-9: 1

## 2022-07-19 ASSESSMENT — ANXIETY QUESTIONNAIRES
5. BEING SO RESTLESS THAT IT IS HARD TO SIT STILL: 1
3. WORRYING TOO MUCH ABOUT DIFFERENT THINGS: 3
1. FEELING NERVOUS, ANXIOUS, OR ON EDGE: 3
4. TROUBLE RELAXING: 1
GAD7 TOTAL SCORE: 15
6. BECOMING EASILY ANNOYED OR IRRITABLE: 1
7. FEELING AFRAID AS IF SOMETHING AWFUL MIGHT HAPPEN: 3
2. NOT BEING ABLE TO STOP OR CONTROL WORRYING: 3
IF YOU CHECKED OFF ANY PROBLEMS ON THIS QUESTIONNAIRE, HOW DIFFICULT HAVE THESE PROBLEMS MADE IT FOR YOU TO DO YOUR WORK, TAKE CARE OF THINGS AT HOME, OR GET ALONG WITH OTHER PEOPLE: SOMEWHAT DIFFICULT

## 2022-07-19 NOTE — PROGRESS NOTES
[address]  YOB: 1995    Date of Service:  2022    Chief Complaint:   Sherry Pascual is a 27 y.o. female who presents for complete physical examination. HPI:   Chronic health issues include improved migraines, elevated BMI, dyspnea, acne, obesity, recurrent abdominal pain. Mood disorder, PTSD. Weight concern. ++ exercise and low carb diet. Rare knee pain, + hoarseness without MICKIE. + elevated BP, no apnea. Wishes to do the weight loss clinic. Not interested in surgery. Never had a cortisol. Hx asthma. Triggers pollen, purfume, colds, proair daily x 3 week. Nocturnal cough. Denies heart burn. no eczemia. .  No longer using Breo as it is not covered. Uses albuterol 3 times weekly. Some shortness of breath with exertion. No tobacco.       Currently on Prozac 40 mg. Has seen psychology. PTSD. Likes to read. + nightmares, restless. Following up with Maureen Ortez psychiatric nurse practitioner in August.      History of recurrent abdominal and CT scans. Most recent 2019 with abdominal discomfort. Liver, gallbladder, pancreas, spleen, adrenals, kidneys, and GI tract were unremarkable were no stones. CT scan for abdominal pain May 2019 with similar findings. There was no evidence of stones. Seen GI with upper and lower endoscopy which were negative. Has history of migraines. Has been given Bentyl and Pamelor in the past.  Not using now. Upcoming IUD. No diagnosis of endometriosis. History back and arm discomfort this is resolved. Did not have MRA or CTA suggested by neurology. Evaluation for reported melena with negative EGD and colonoscopy 2019. Cystic acne improved with medicine however did not get refill ago up to the dermatology appointment. Using topicals. Migraines improved intolerant Topamax. Family history.  implant IUD           PMH:    Migraines     SH   . Degree in  childhood education. Patient's chart reviewed, please contact to schedule OA colonoscopy with .Patient Preference    Screening Criteria  Age: 52 year old Patient meets age criteria.  PCP:  Robin Rich MD  Personal H/O Colon CA or Polyps: Patient does NOT have a personal history of colon polyps or colon cancer  Family H/O Colon CA: No family history of colon cancer.  GI Symptoms: No  Most recent colon procedure No prior Flexi or Colonoscopy  Concerns for taking prep at home(mobility, etc): No    ALLERGIES:  No Known Allergies    Medications:      Summary of your Discharge Medications          Accurate as of January 14, 2020  4:48 PM. Always use your most recent med list.            Take these Medications      Details   atorvastatin 10 MG tablet  Commonly known as:  LIPITOR   Take 1 tablet by mouth daily.     Biotin 5000 MCG Cap   Take 1 capsule by mouth daily.     calcium-magnesium-vitamin D 600- MG-MG-UNIT extended-release tablet   Take by mouth daily.     fish oil-omega-3 fatty acids 1000 MG Cap   Take 2,000 mg by mouth daily.     SCARLET FE 1/20 1-20 MG-MCG per tablet   Generic drug:  norethindrone-ethinyl estradiol-FE  Take 1 tablet by mouth daily.     olmesartan-hydrochlorothiazide 20-12.5 MG per tablet  Commonly known as:  BENICAR HCT   Take one-half tablet by mouth daily.     vitamin C 500 MG tablet   Take 500 mg by mouth daily.     vitamin D 50 mcg (2,000 units) capsule   Take 2,000 Units by mouth daily.            Anticoagulation (examples - coumadin, heparin, lovenox, xarelto, pradaxa): No  Platelet Modifying (examples - Plavix, aspirin, nsaids, Aggrenox) : No  Concerns for sedation. On Chronic long acting narcotics, Methadone, Suboxone, antianxiety like xanax, klonazepam: No  Review of other medications indicate - diuretic   BMI: Estimated body mass index is 33.11 kg/m² as calculated from the following:    Height as of 10/23/19: 5' 9\" (1.753 m).    Weight as of 10/23/19: 101.7 kg.:more than 45 No  Is the patient over  300 lbs Weight:   Wt Readings from Last 1 Encounters:   10/23/19 101.7 kg   :  No  On Oxygen:  No    Past Medical History:  Past Medical History:   Diagnosis Date   • DUB (dysfunctional uterine bleeding)    • HTN (hypertension)    • Hyperlipidemia    • Obesity (BMI 30.0-34.9) 4/19/2019   • Ovarian cyst, right    • PCOS (polycystic ovarian syndrome)        Past Surgical History:  Past Surgical History:   Procedure Laterality Date   • Ankle surgery Right 08/1983    Bone spur        Other Concerns: none noted    Prior Labs: If abnormal creatinine/electrolytes, then will need Nulytely prep  Creatinine   Date Value Ref Range Status   10/23/2019 0.74 0.51 - 0.95 mg/dL Final     BUN   Date Value Ref Range Status   10/23/2019 11 6 - 20 mg/dL Final     Sodium   Date Value Ref Range Status   10/23/2019 136 135 - 145 mmol/L Final     Potassium   Date Value Ref Range Status   10/23/2019 3.9 3.4 - 5.1 mmol/L Final     Chloride   Date Value Ref Range Status   10/23/2019 104 98 - 107 mmol/L Final     Carbon Dioxide   Date Value Ref Range Status   10/23/2019 28 21 - 32 mmol/L Final     Glucose   Date Value Ref Range Status   10/23/2019 82 65 - 99 mg/dL Final     CALCIUM   Date Value Ref Range Status   10/23/2019 8.8 8.4 - 10.2 mg/dL Final        SCHEDULING:  OK to schedule open access colonoscopy, if no then will need office visit: Yes  Physician Scheduled with: Patient Preference  Diagnosis code from O/A order:  Colon Cancer Screening Z12.11  Location: Patient Preference  Sedation: Moderate sedation    Prep: Physician Preference     going to high school [employer]. High risk pateints     FH: 1 brother 1 sister    + MGM breast cancer 50's, migraines. MI 60's,depression alcohol, obesity. .     - HTN     ROS: No syncope. Positive sinus issues, acne, snoring no apnea. Occasional wheeze. No chest pain palpitations or syncope. No breast concerns. Rare GE reflux. Recent constipation. Had had urinary tract infections in the past. No menstrual concerns on birth control pills. No concern with STD.     Wt Readings from Last 3 Encounters:   03/29/21 262 lb 12.6 oz (119.2 kg)   09/24/20 268 lb (121.6 kg)   06/16/20 257 lb (116.6 kg)     BP Readings from Last 3 Encounters:   03/29/21 (!) 149/72   09/24/20 (!) 166/102   06/16/20 (!) 141/89       Patient Active Problem List   Diagnosis    Asthma    Migraine with aura and without status migrainosus, not intractable    Obesity (BMI 35.0-39.9 without comorbidity)    Acne    Congenital nevus    Mood disorder (HCC)    Iron deficiency    Heavy menses    Lower abdominal pain    Concussion    Pain of upper abdomen    Left ovarian cyst    Alcohol intoxication ([facility] [address]       Preventive Care:  Health Maintenance   Topic Date Due    COVID-19 Vaccine (1) Never done    HIV screen  Never done    Hepatitis A vaccine (2 of 2 - 2-dose series) 10/25/2014    Hepatitis C screen  Never done    Pap smear  Never done    Pneumococcal 0-64 years Vaccine (2 - PCV) 07/26/2019    Chlamydia screen  01/29/2020    Flu vaccine (1) 09/01/2022    Depression Monitoring  06/29/2023    DTaP/Tdap/Td vaccine (8 - Td or Tdap) 10/25/2026    Hepatitis B vaccine  Completed    Hib vaccine  Completed    HPV vaccine  Completed    Varicella vaccine  Completed    Meningococcal (ACWY) vaccine  Aged Out      Lipid panel:   Lab Results   Component Value Date    CHOL 223 (H) 07/26/2018    TRIG 174 (H) 07/26/2018    HDL 68 (H) 07/26/2018    LDLCALC 120 (H) 07/26/2018        Immunization History   Administered Date(s) Administered    HPV Quadrivalent (Gardasil) 12/23/2008, 11/04/2009, 11/30/2010    Hepatitis A 04/25/2014    Hepatitis B [birth year], 06/23/1998    MMRV (ProQuad) 09/22/1998, 01/22/2003    Pneumococcal Polysaccharide ([id number]) 07/26/2018    Polio IPV (IPOL) 1997, 01/21/1998, 04/06/1999, 09/26/2001, 04/22/2014    Tdap (Boostrix, Adacel) 12/23/2008, 10/25/2016    Varicella Zoster Immune Globulin 12/23/2008, 10/19/2009       Allergies   Allergen Reactions    Latex     Hibiscus Extract Other (See Comments) and Swelling    Other Hives     Perfumes cause migraines, causes asthma attack     Outpatient Medications Marked as Taking for the 7/19/22 encounter (Office Visit) with Juan Antonio Mcdonald MD   Medication Sig Dispense Refill    prazosin (MINIPRESS) 1 MG capsule 1 po q hs x 3 days then increase by 1 pill every 3 days to max of  8 or side effects 120 capsule 0    FLUoxetine (PROZAC) 10 MG capsule Take 4 capsules by mouth daily 120 capsule 0    albuterol sulfate HFA (PROAIR HFA) 108 (90 Base) MCG/ACT inhaler Inhale 2 puffs into the lungs every 6 hours as needed for Wheezing 1 each 2    spironolactone (ALDACTONE) 50 MG tablet TAKE 2 TABLETS BY MOUTH EVERY MORNING AND 1 TABLET EVERY EVENING AS DIRECTED      fluticasone (FLONASE) 50 MCG/ACT nasal spray 1 spray by Each Nostril route daily 2 Bottle 1       Past Medical History:   Diagnosis Date    Asthma     Depression     Heavy menses 4/23/2019    Iron deficiency 4/23/2019    OCD (obsessive compulsive disorder)      Past Surgical History:   Procedure Laterality Date    COLONOSCOPY N/A 6/4/2019    COLONOSCOPY WITH BIOPSY performed by Dmaion Tilley MD at [address] [place] [address] N/A 6/4/2019    EGD BIOPSY performed by Damion Tilley MD at [place] [address]       Family History   Problem Relation Age of Onset    Mult Sclerosis Mother     Breast Cancer Paternal Grandmother     No Known Problems Father     Depression Sister     No Known Problems Brother     No Known Problems Maternal Aunt     No Known Problems Maternal Uncle     No Known Problems Paternal Aunt     No Known Problems Paternal Uncle     No Known Problems Maternal Grandmother     No Known Problems Maternal Grandfather     No Known Problems Paternal Grandfather     No Known Problems Other     Anesth Problems Neg Hx     Broken Bones Neg Hx     Cancer Neg Hx     Clotting Disorder Neg Hx     Collagen Disease Neg Hx     Diabetes Neg Hx     Dislocations Neg Hx     Osteoporosis Neg Hx     Rheumatologic Disease Neg Hx     Scoliosis Neg Hx     Severe Sprains Neg Hx      Social History     Socioeconomic History    Marital status:      Spouse name: Not on file    Number of children: Not on file    Years of education: Not on file    Highest education level: Not on file   Occupational History    Occupation: Student   Tobacco Use    Smoking status: Never    Smokeless tobacco: Never   Vaping Use    Vaping Use: Never used   Substance and Sexual Activity    Alcohol use: Yes     Comment: month whiskey    Drug use: No    Sexual activity: Yes     Partners: Male     Comment: condom   Other Topics Concern    Not on file   Social History Narrative    Not on file     Social Determinants of Health     Financial Resource Strain: Low Risk     Difficulty of Paying Living Expenses: Not hard at all   Food Insecurity: No Food Insecurity    Worried About Running Out of Food in the Last Year: Never true    Ran Out of Food in the Last Year: Never true   Transportation Needs: Not on file   Physical Activity: Not on file   Stress: Not on file   Social Connections: Not on file   Intimate Partner Violence: Not on file   Housing Stability: Not on file     Physical Exam:   Vitals:    07/19/22 1524 07/19/22 1551   BP: (!) 157/97 (!) 140/88   Pulse: 99    Resp: 18    SpO2: 99%    Weight: 272 lb (123.4 kg)    Height: 5' 5\" (1.651 m)      Body mass index is 45.26 kg/m².    Constitutional: She is oriented to person, place, and time. She appears well-developed and well-nourished. No distress. HEENT: Crowded hypopharynx good dentition. TMs unremarkable. Full neck. No adenopathy. Palpable thyroid. Lungs are clear no wheezes rales or rhonchi. Cardiovascular exam regular rate and rhythm without any gallop or rub murmur. Positive obesity without mass or tenderness. No peripheral edema. No crepitus of the knees  Multiple pustules back with scarring. She is appropriate. Well-groomed. Good eye contact. Assessment/Plan:   Diagnosis Orders   1. BMI 40.0-44.9, adult ([facility] [address]  [employer] [facility] [place]    CORTISOL, FREE      2. Mild intermittent asthma without complication        3. Obesity (BMI 35.0-39.9 without comorbidity)        4. Acne vulgaris        5. Migraine with aura and without status migrainosus, not intractable        6. Mood disorder (HCC)        7. Elevated blood pressure reading        8. Sleep disorder          Referral weight loss clinic. Cortisol. Mild intermittent asthma. Albuterol 3 times weekly. Acne. Continue with her dermatologist.    History of migraines well controlled. Mood disorder PTSD anxiety depression seem to follow-up with psychiatry. No suicidal thought. We will try prazosin taper for sleep. Follow back up in 2 weeks for repeat blood pressure check weight and cortisol. Elevated blood pressure. Continue on her spironolactone also check blood pressure with prazosin. Sleep disorder. Consider sleep apnea testing if  says she has apnea at nighttime    GYN care elsewhere.   Has upcoming IUD and Pap as needed

## 2022-07-27 NOTE — PROGRESS NOTES
Behavioral Health Consultation- Follow UP  SAMANTHA Moreira  7/29/2022   12:40 PM      Nurys Beauchamp, was evaluated through a synchronous (real-time) audio-video encounter. The patient (or guardian if applicable) is aware that this is a billable service, which includes applicable co-pays. This Virtual Visit was conducted with patient's (and/or legal guardian's) consent. Verbal consent to proceed has been obtained within the past 12 months. The visit was conducted pursuant to the emergency declaration under the [address] [address] [place] [address] authority and the [place] Act. Patient identification was verified, and a caregiver was present when appropriate. The patient was located in a state where the provider was licensed to provide care. Time spent with Patient: 30 minutes  This is patient's second  [facility] appointment. Reason for Consult:    Chief Complaint   Patient presents with    Anxiety       Referring Provider: Iván Rodriguez MD  [address] [place] [address]    Patient provided informed consent for the behavioral health program. Discussed with patient model of service to include the limits of confidentiality (i.e. abuse reporting, suicide intervention, etc.) and short-term intervention focused approach. Pt indicated understanding. Feedback given to PCP. Verified the following information:  Patient's identification: Yes  Patient location: [address] [facility] [address] [place] [address]   Patient's call back number: [phone]   Patient's emergency contact's name and number, as well as permission to contact them if needed: Extended Emergency Contact Information  Primary Emergency Contact: [address] Phone: [phone]  Relation: Parent   needed?  No  Secondary Emergency Contact: [address] [facility] Phone: [phone]  Relation: Other   needed? No     Provider location: [address]:  Last appointment 6/29/22    Pt has scheduled an appointment with Josie Aase, and they met yesterday for the first appointment. Pt states having an increase in anxiety and trauma sxs (intrusive thoughts/nightmares and tachycardia) as a result of talking about past trauma. Pt states that she shared about each trauma during the appointment. Pt reports difficulty falling asleep last night, and states that she only had about 3 hours of sleep. Pt states that she started taking Prazosin, however, it has occasionally resulted in vomiting. Pt states that she hasn't vomited in a couple days, and has eaten crackers when taking medication, so is hopeful the side effect has passed. Pt states that exercise is helpful, as she has been working out 4-5x/week. Pt has been using diaphragmatic breaths and grounding as well to help with sxs.     O:  MSE:    Appearance: adequate hygiene  Attitude: cooperative and friendly  Consciousness: alert  Orientation: oriented to person, place, time, general circumstance  Memory    recent and remote memory intact  Attention/Concentration    intact  Psychomotor Activity:normal  Eye Contact: normal  Speech: normal rate and volume, well-articulated  Mood    Anxious  Affect    normal affect  Perception: within normal limits  Thought Content: within normal limits  Thought Process: logical, coherent and goal-directed  Associations    logical connections  Insight: good  Judgment    Intact  Appetite normal  Sleep disturbance Yes  Fatigue Yes  Loss of pleasure No  Impulsive behavior No  Morbid Ideation: no   Suicide Assessment: no suicidal ideation, plan, or intent  Homicidal Ideation: no    History:    Medications:   Current Outpatient Medications   Medication Sig Dispense Refill    prazosin (MINIPRESS) 1 MG capsule 1 po q hs x 3 days then increase by 1 pill every 3 days to max of  8 or side effects 120 capsule 0    FLUoxetine (PROZAC) 10 MG capsule Take 4 capsules by mouth daily 120 capsule 0    albuterol sulfate HFA (PROAIR HFA) 108 (90 Base) MCG/ACT inhaler Inhale 2 puffs into the lungs every 6 hours as needed for Wheezing 1 each 2    spironolactone (ALDACTONE) 50 MG tablet TAKE 2 TABLETS BY MOUTH EVERY MORNING AND 1 TABLET EVERY EVENING AS DIRECTED      fluticasone (FLONASE) 50 MCG/ACT nasal spray 1 spray by Each Nostril route daily 2 Bottle 1    Hyoscyamine Sulfate SL (LEVSIN/SL) 0.125 MG SUBL 1 po up to qid for abdominal pain (Patient not taking: Reported on 10/30/2020) 40 each 0    omeprazole (PRILOSEC) 20 MG delayed release capsule 1 po q day x 14 days (Patient not taking: Reported on 6/16/2020) 14 capsule 0     No current facility-administered medications for this visit. Social History:   Social History     Socioeconomic History    Marital status:      Spouse name: Not on file    Number of children: Not on file    Years of education: Not on file    Highest education level: Not on file   Occupational History    Occupation: Student   Tobacco Use    Smoking status: Never    Smokeless tobacco: Never   Vaping Use    Vaping Use: Never used   Substance and Sexual Activity    Alcohol use: Yes     Comment: month whiskey    Drug use: No    Sexual activity: Yes     Partners: Male     Comment: condom   Other Topics Concern    Not on file   Social History Narrative    Not on file     Social Determinants of Health     Financial Resource Strain: Low Risk     Difficulty of Paying Living Expenses: Not hard at all   Food Insecurity: No Food Insecurity    Worried About Running Out of Food in the Last Year: Never true    Ran Out of Food in the Last Year: Never true   Transportation Needs: Not on file   Physical Activity: Not on file   Stress: Not on file   Social Connections: Not on file   Intimate Partner Violence: Not on file   Housing Stability: Not on file     TOBACCO:   reports that she has never smoked.  She has never used smokeless tobacco.  ETOH:   reports current alcohol use. Family History:   Family History   Problem Relation Age of Onset    Mult Sclerosis Mother     Breast Cancer Paternal Grandmother     No Known Problems Father     Depression Sister     No Known Problems Brother     No Known Problems Maternal Aunt     No Known Problems Maternal Uncle     No Known Problems Paternal Aunt     No Known Problems Paternal Uncle     No Known Problems Maternal Grandmother     No Known Problems Maternal Grandfather     No Known Problems Paternal Grandfather     No Known Problems Other     Anesth Problems Neg Hx     Broken Bones Neg Hx     Cancer Neg Hx     Clotting Disorder Neg Hx     Collagen Disease Neg Hx     Diabetes Neg Hx     Dislocations Neg Hx     Osteoporosis Neg Hx     Rheumatologic Disease Neg Hx     Scoliosis Neg Hx     Severe Sprains Neg Hx          A:  Patient endorses stable mood. Denies SI/HI, with good insight and motivation. Diagnosis:    1. PTSD (post-traumatic stress disorder)    2.  PAOLA (generalized anxiety disorder)          Past Diagnosis:        Diagnosis Date    Asthma     Depression     Elevated blood pressure reading 7/19/2022    Heavy menses 4/23/2019    Iron deficiency 4/23/2019    OCD (obsessive compulsive disorder)          Plan:  Patient interventions:  Provided handout on  progressive muscle relaxation and 4-7-8 breathing  Discussed self-care (sleep, nutrition, rewarding activities, social support, exercise)  Emphasized self-care as important for managing overall health  Explained relaxed breathing technique in detail and practiced this with pt in visit  Trained in relaxation strategies  Supportive techniques  Promoted hope using strengths-based approach    Patient Behavioral Change Plan:   See Patient Instructions

## 2022-07-29 ENCOUNTER — TELEMEDICINE (OUTPATIENT)
Dept: PSYCHOLOGY | Age: 27
End: 2022-07-29
Payer: COMMERCIAL

## 2022-07-29 DIAGNOSIS — F41.1 GAD (GENERALIZED ANXIETY DISORDER): ICD-10-CM

## 2022-07-29 DIAGNOSIS — F43.10 PTSD (POST-TRAUMATIC STRESS DISORDER): Primary | ICD-10-CM

## 2022-07-29 PROCEDURE — 90832 PSYTX W PT 30 MINUTES: CPT | Performed by: SOCIAL WORKER

## 2022-07-29 NOTE — PATIENT INSTRUCTIONS
Use progressives muscle relaxation or 4-7-8 breathing to help with sleep. Incorporate daily relaxation techniques. Engage in extra self-care on therapy days. During intrusive thoughts do diaphragmatic with buttery fly hug or alternating hands, or grounding techniques. Progressive muscle relaxation (PMR) is an exercise that anyone can use to alleviate disturbing and disruptive emotional symptoms such as anxiety or insomnia. Like breathing exercises, visualization, and yoga, PMR is considered a relaxation technique. It's especially helpful in moments of high stress or nervousness, and even can help someone get through a panic attack. History of PMR  PMR was developed by an [place] physician, Ilia Kelly, in the 1920s. Bela Loiaza noted that regardless of their illness, the majority of his patients suffered from muscle pain and tension. When he suggested that they relax, he noticed that most people didn't seem connected enough to their physical tension to release it. This inspired Bela Loaiza to develop a sequence of steps for tightening and then relaxing groups of muscles. He found this allowed his patients to become more aware of their tension, to learn how to let go of it, and to recognize what it feels like to be in a relaxed state. Since then the technique has been modified many times but all modern variations of PMR are based on Gandaras original idea of systematically squeezing and then releasing isolated muscle groups. Does It Work--and How? PMR works in part by helping to overcome a normal reaction to stress known as the flight-or-fight response. In evolutionary terms, this reaction developed as a way to help animals survive a threat--either by running away or by meeting the opposition head-on. Over time the flight-or-fight response has become a common reaction to feelings of fear that often are out of proportion with reality.       Unfortunately, when it's not needed for actual survival, the flight-or-fight reaction tends to bring on many uncomfortable physical symptoms, including accelerated heart rate, sweating, shaking, and shortness of breath--largely the product of an influx of stress hormones. Also, muscle pain, tension, and stiffness are common symptoms brought on by stress and anxiety. Relaxation techniques, including PMR, have the reverse effect on the body, eliciting the relaxation response, lowering heart rate, calming the mind, and reducing bodily tension. PMR also can help a person become more aware of how their physical stress may be contributing to their emotional state. By relaxing the body, a person may be able to let go of anxious thoughts and feelings. PMR Step-by-Step  For a quick taste of how PMR works, squeeze one of your fists as hard as you can. Notice how tight your fingers and forearm feel. Count to ten and then release the clinch. Allow your hand to relax completely and let go of any tension. Let your hand go limp and notice how relaxed it feels now compared to before your clinched your fist.       This methodical approach to increasing and releasing tension throughout your body is the linchpin of PMR: By systematically constricting and releasing various muscle groups it is possible to relieve physical stress and quiet and calm the mind. Here are the steps for one version of PMR that anyone can do. Try it next time you're feeling nervous, anxious, or find yourself tossing, turning, and unable to sleep. Step 1    Get comfortable. You don't have to lie down to do PMR; it will work if you're sitting up in a chair. Do make sure you're in a place that's free of distraction. Close your eyes if that feels best for you. Step 2    Breathe. Inhale deeply through your nose, feeling your abdomen rise as you fill your body with air. Then slowly exhale from your mouth, drawing your navel toward your spine. Repeat three to five times.     Step 3    Starting with your feet, tighten and release your muscles. Clench your toes and pressing your heels toward the ground. Squeeze tightly for a few breaths and then release. Now flex your feet in, pointing your toes up towards your head. Hold for a few seconds and then release. Step 4    Continue to work your way up your body, tightening and releasing each muscle group. Work your way up in this order: legs, glutes, abdomen, back, hands, arms, shoulders, neck, and face. Try to tighten each muscle group for a few breaths and then slowly release. Repeat any areas that feel especially stiff. Step 5     End the practice by taking a few more deep breaths, noting how much more calm and relaxed you feel. PMR is a skill, one that takes practice to master. In order to be able to draw on PMR when you need it--in other words, when you're truly in a stressful or anxiety-provoking situation--you'll want to learn how to do it while you aren't under pressure. Practice PMR several times a week to become aware of what it's like to feel relaxed. Understanding this feeling can help you to more readily let go of tension when anxiety rises. 4 -7- 8 Breath Relaxation Exercise To manage Stress/Anxiety    Anyone can do it. .. Simple    Quick    No equipment needed    Do it anywhere    Are the numbers important? The absolute time you spend on each phase is not important; the ratio of 4:7:8 is important. If you have trouble  holding your breath, speed the exercise up but keep to  the ratio of 4:7:8 for the three phases. With practice you  can slow it all down and get used to inhaling and exhaling more and more deeply. Why should I do it? This exercise is a natural tranquilizer for the nervous system. Unlike tranquilizing drugs, which are often effective  when you first take them but then lose their power over  time, this exercise is subtle when you first try it but gains  in power with repetition and practice.  Use this new

## 2022-08-02 ENCOUNTER — NURSE ONLY (OUTPATIENT)
Dept: FAMILY MEDICINE CLINIC | Age: 27
End: 2022-08-02
Payer: COMMERCIAL

## 2022-08-02 ENCOUNTER — TELEPHONE (OUTPATIENT)
Dept: FAMILY MEDICINE CLINIC | Age: 27
End: 2022-08-02

## 2022-08-02 VITALS
DIASTOLIC BLOOD PRESSURE: 85 MMHG | HEART RATE: 87 BPM | SYSTOLIC BLOOD PRESSURE: 140 MMHG | BODY MASS INDEX: 44.66 KG/M2 | WEIGHT: 268.4 LBS

## 2022-08-02 DIAGNOSIS — E66.01 CLASS 3 SEVERE OBESITY WITH BODY MASS INDEX (BMI) OF 40.0 TO 44.9 IN ADULT, UNSPECIFIED OBESITY TYPE, UNSPECIFIED WHETHER SERIOUS COMORBIDITY PRESENT (HCC): Primary | ICD-10-CM

## 2022-08-02 LAB
REASON FOR REJECTION: NORMAL
REJECTED TEST: NORMAL

## 2022-08-02 NOTE — TELEPHONE ENCOUNTER
[facility] lab    Unable to run cortisol, free    Sent ambient and no draw time was written on the tub.

## 2022-08-02 NOTE — PROGRESS NOTES
Pt with some nausea on medication. No more vomiting with prazosin. Less  nightmares. Felt poorly when missed dose night before and had to send  to get medication. Wishes to continue with taper. Psych follow up in 1 month.

## 2022-08-03 ENCOUNTER — TELEPHONE (OUTPATIENT)
Dept: FAMILY MEDICINE CLINIC | Age: 27
End: 2022-08-03

## 2022-08-03 NOTE — TELEPHONE ENCOUNTER
Nancie Garcia MA   8/3/2022 10:01 AM EDT Back to [facility] for patient to return call regarding her blood draw. Ramya Palafox MD   8/2/2022  2:09 PM EDT         Let pt know needs to go to hosp lab for this. Have her remind lab this needsspecial processing   Pt called and I advised of notes and Pt stated she understood and would go to the hosp for blood draw and I reminded her to tell them it was special processing needed.

## 2022-08-12 DIAGNOSIS — F39 MOOD DISORDER (HCC): ICD-10-CM

## 2022-08-15 RX ORDER — FLUOXETINE 10 MG/1
40 CAPSULE ORAL DAILY
Qty: 120 CAPSULE | Refills: 2 | Status: SHIPPED | OUTPATIENT
Start: 2022-08-15 | End: 2022-11-04

## 2022-08-24 RX ORDER — PRAZOSIN HYDROCHLORIDE 2 MG/1
CAPSULE ORAL
Qty: 60 CAPSULE | Refills: 0 | Status: SHIPPED | OUTPATIENT
Start: 2022-08-24 | End: 2022-09-25 | Stop reason: SDUPTHER

## 2022-08-24 NOTE — TELEPHONE ENCOUNTER
How much minipress is she taking now and  I will refill at that dose   look forward to follow up with Mikel 9.2

## 2022-08-31 ENCOUNTER — TELEPHONE (OUTPATIENT)
Dept: FAMILY MEDICINE CLINIC | Age: 27
End: 2022-08-31

## 2022-08-31 NOTE — TELEPHONE ENCOUNTER
Pt called in stating her  was diagnosed with an upper respiratory infection the other day and got a Z pack. Tested covid and it was negative. Pt is wondering if she can get a z pack called in as well to [facility] in [place].

## 2022-08-31 NOTE — TELEPHONE ENCOUNTER
Most uri's are not bacterial.  I rec mucinex, cough drops, afrin/flonase for any congestive symptoms.   Reasonable to check for covid as well in 2-3 days

## 2022-09-02 ENCOUNTER — OFFICE VISIT (OUTPATIENT)
Dept: PSYCHIATRY | Age: 27
End: 2022-09-02
Payer: COMMERCIAL

## 2022-09-02 VITALS
DIASTOLIC BLOOD PRESSURE: 86 MMHG | WEIGHT: 269 LBS | HEIGHT: 65 IN | SYSTOLIC BLOOD PRESSURE: 124 MMHG | BODY MASS INDEX: 44.82 KG/M2

## 2022-09-02 DIAGNOSIS — F42.2 MIXED OBSESSIONAL THOUGHTS AND ACTS: ICD-10-CM

## 2022-09-02 DIAGNOSIS — F50.81 BINGE EATING DISORDER: Primary | ICD-10-CM

## 2022-09-02 DIAGNOSIS — F43.10 PTSD (POST-TRAUMATIC STRESS DISORDER): ICD-10-CM

## 2022-09-02 PROCEDURE — 99204 OFFICE O/P NEW MOD 45 MIN: CPT | Performed by: NURSE PRACTITIONER

## 2022-09-02 ASSESSMENT — ANXIETY QUESTIONNAIRES
3. WORRYING TOO MUCH ABOUT DIFFERENT THINGS: 3
IF YOU CHECKED OFF ANY PROBLEMS ON THIS QUESTIONNAIRE, HOW DIFFICULT HAVE THESE PROBLEMS MADE IT FOR YOU TO DO YOUR WORK, TAKE CARE OF THINGS AT HOME, OR GET ALONG WITH OTHER PEOPLE: VERY DIFFICULT
4. TROUBLE RELAXING: 2
GAD7 TOTAL SCORE: 17
5. BEING SO RESTLESS THAT IT IS HARD TO SIT STILL: 3
7. FEELING AFRAID AS IF SOMETHING AWFUL MIGHT HAPPEN: 3
6. BECOMING EASILY ANNOYED OR IRRITABLE: 1
1. FEELING NERVOUS, ANXIOUS, OR ON EDGE: 2
2. NOT BEING ABLE TO STOP OR CONTROL WORRYING: 3

## 2022-09-02 ASSESSMENT — PATIENT HEALTH QUESTIONNAIRE - PHQ9
SUM OF ALL RESPONSES TO PHQ QUESTIONS 1-9: 11
10. IF YOU CHECKED OFF ANY PROBLEMS, HOW DIFFICULT HAVE THESE PROBLEMS MADE IT FOR YOU TO DO YOUR WORK, TAKE CARE OF THINGS AT HOME, OR GET ALONG WITH OTHER PEOPLE: 1
7. TROUBLE CONCENTRATING ON THINGS, SUCH AS READING THE NEWSPAPER OR WATCHING TELEVISION: 0
9. THOUGHTS THAT YOU WOULD BE BETTER OFF DEAD, OR OF HURTING YOURSELF: 0
SUM OF ALL RESPONSES TO PHQ QUESTIONS 1-9: 11
1. LITTLE INTEREST OR PLEASURE IN DOING THINGS: 1
5. POOR APPETITE OR OVEREATING: 0
SUM OF ALL RESPONSES TO PHQ QUESTIONS 1-9: 11
SUM OF ALL RESPONSES TO PHQ9 QUESTIONS 1 & 2: 2
SUM OF ALL RESPONSES TO PHQ QUESTIONS 1-9: 11
8. MOVING OR SPEAKING SO SLOWLY THAT OTHER PEOPLE COULD HAVE NOTICED. OR THE OPPOSITE, BEING SO FIGETY OR RESTLESS THAT YOU HAVE BEEN MOVING AROUND A LOT MORE THAN USUAL: 2
3. TROUBLE FALLING OR STAYING ASLEEP: 3
2. FEELING DOWN, DEPRESSED OR HOPELESS: 1
6. FEELING BAD ABOUT YOURSELF - OR THAT YOU ARE A FAILURE OR HAVE LET YOURSELF OR YOUR FAMILY DOWN: 3
4. FEELING TIRED OR HAVING LITTLE ENERGY: 1
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
## 2022-09-07 ENCOUNTER — OFFICE VISIT (OUTPATIENT)
Dept: BARIATRICS/WEIGHT MGMT | Age: 27
End: 2022-09-07

## 2022-09-07 VITALS
BODY MASS INDEX: 44.98 KG/M2 | WEIGHT: 270 LBS | SYSTOLIC BLOOD PRESSURE: 125 MMHG | HEIGHT: 65 IN | DIASTOLIC BLOOD PRESSURE: 88 MMHG

## 2022-09-07 DIAGNOSIS — Z01.818 PREOPERATIVE CLEARANCE: ICD-10-CM

## 2022-09-07 DIAGNOSIS — E66.01 MORBID OBESITY WITH BMI OF 40.0-44.9, ADULT (HCC): ICD-10-CM

## 2022-09-07 PROCEDURE — 99999 PR OFFICE/OUTPT VISIT,PROCEDURE ONLY: CPT

## 2022-09-07 NOTE — PROGRESS NOTES
Antionette Dexter is a 27 y.o. female with a date of birth of 1995. Vitals:    09/07/22 1527   BP: 125/88   Site: Left Upper Arm   Weight: 270 lb (122.5 kg)   Height: 5' 5\" (1.651 m)    BMI: Body mass index is 44.76 kg/m². Obesity Classification: Class III    Weight History: Wt Readings from Last 3 Encounters:   09/02/22 269 lb (122 kg)   08/02/22 268 lb 6.4 oz (121.7 kg)   07/19/22 272 lb (123.4 kg)     Patient's lowest adult weight was 195 lb at age 26. Patient's highest adult weight was 272 lb at age 27. Patient has participated in the following weight loss programs: , keto, tracking and weight watchers. Patient has not participated in meal replacement/liquid diets. Patient has not participated in weight loss medications. Patient is lactose intolerant for milk. Patient does not have food allergies. Patient does not have Oriental orthodox/cultural food preferences. 24 hour recall/food frequency chart: Tracking with [employer] 1400 kcal, 140 carbs, 47 fat, 105 protein. 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 25 minute lunch at 10 AM and planning 2-2:45 PM.  Breakfast: Coffee w/ splenda  Snack: None  Lunch: 10 AM: 6 oz chix + 1/2 cup rice + 1 tbsp queso and salsa  Snack: None  Dinner: 6-7 PM: 6 oz steak + 1 serving pasta + 1 cup corn w/ I can't believe it's not butter  Snack: 9 PM: 1 cup Special K protein + 1 cup skim milk  Bed around 11:30 PM, wakes frequently at night   Drinks throughout the day: 128 oz container water finishes at least 64 oz, coffee w/ splenda, skim milk  Do you drink alcohol? yes. How often/how much alcohol do you drink: 5-6 Mixed Drinks per week. Patient  may meet the criteria for binge eating disorder - eats beyond fullness. Patient does have grazing. Patient does not have night eating. Patient does have a history of emotional eating or eating out of boredom/stress.     Physical Activity: strength 45 minutes 3-5x/week + cardio 1.5 hours 1/week has used  in the past      Goals  Weight: 195 lbs  Health Improvement: improve HTN, feel better/ \"normal\"/ increase self-confidence, enjoy activity - get back to running     Assessment  Nutritional Needs: RMR=(9.99 x 122.5) + (6.25 x 162.1) - (4.92 x 26 y.o.) -161= 1958 kcal x 1.3 (sedentary activity factor)= 2545 kcal - 1000 (for 2 lb weight loss/week)= 1545 kcal.    Plan  Plan/Recommendations: General weight loss/lifestyle modification strategies discussed (elicit support from others; identify saboteurs; non-food rewards, etc). Diet interventions: 1500 kcal LC. Regular aerobic exercise program discussed. Optifast:  Not Interested  Diet Medications:  Interested     Handouts: protein drinks     PES Statement: Overweight/Obesity related to lack of exercise, sedentary lifestyle, unhealthy eating habits, and unsuccessful diet attempts as evidenced by BMI. Body mass index is 44.76 kg/m². Will follow up as necessary.     Dara Paula, RD, LD

## 2022-09-08 ENCOUNTER — TELEMEDICINE (OUTPATIENT)
Dept: BARIATRICS/WEIGHT MGMT | Age: 27
End: 2022-09-08
Payer: COMMERCIAL

## 2022-09-08 DIAGNOSIS — E66.01 MORBID OBESITY WITH BMI OF 40.0-44.9, ADULT (HCC): Primary | ICD-10-CM

## 2022-09-08 DIAGNOSIS — Z71.3 DIETARY COUNSELING AND SURVEILLANCE: ICD-10-CM

## 2022-09-08 PROCEDURE — 99204 OFFICE O/P NEW MOD 45 MIN: CPT | Performed by: FAMILY MEDICINE

## 2022-09-08 NOTE — PROGRESS NOTES
Patient: Ruth Ann Peter     Encounter Date: 9/8/2022    YOB: 1995               Age: 24 y.o. Patient identification was verified at the start of the visit. Patient-Reported Vitals 9/8/2022   Patient-Reported Weight 265   Patient-Reported Height 55   Patient-Reported Systolic 597   Patient-Reported Diastolic 82   Patient-Reported Pulse 90   Patient-Reported Temperature 98.3   Patient-Reported SpO2 98%         BP Readings from Last 1 Encounters:   09/07/22 125/88       BMI Readings from Last 1 Encounters:   09/07/22 44.93 kg/m²       Pulse Readings from Last 1 Encounters:   08/02/22 87                                               Chief Complaint   Patient presents with    Bariatric, Initial Visit     MWEILEEN- NP       HPI:    24 y.o. female presents to establish care via video visit. The patient's medical history is significant for class III obesity. The patient has a long-standing history of obesity which started gradually. The problem is severe. The patient has been gaining weight. Risk factors include annual weight gain of >2 lbs (1 kg)/ year and sedentary lifestyle. Aggravating factors include poor diet and lack of physical activity. The patient has tried various diet/exercise plans which have been ineffective in the long-run. She is motivated to start losing weight to help improve her health. Interested in aom to help with appetite regulation. When did you become overweight? [] Childhood   [] Teens   [x] Adulthood   [] Pregnancy   [] Menopause    Highest adult weight: 272 pounds at age 27    Triggers for weight gain? [x] Stress   [] Illness   [] Medications   [] Travel  []Injury     [] Nightshift work   [] Insomnia  [x] No specific triggers   [] Other    Food triggers:   [x] Stress   [x] Boredom   [x] Fast food   [x] Eating out   [] Seeking reward   [] Social     Have you ever taken prescription medications to help you lose weight?    [] Yes  [x] No    Have you ever been on a meal replacement program?  [] Yes  [x] No      The patient denies any significant cardiac or psychiatric disease. The patient denies a history thyroid disease. The patient denies a history of glaucoma. The patient denies a history of nephrolithiasis. The patient denies a history of seizure disorders/epiliepsy. Dietitian's assessment reviewed and addressed with patient. Reviewed:  [x] Nutrition and the importance of regular protein intake  [x] Hidden CHO/carbohydrate sources  [x] Alcohol use  [x] Tobacco use  [x] Drug use- Denies   [x] Importance of exercise and reducing sedentary time        Controlled Substance Monitoring:     No flowsheet data found. Allergies   Allergen Reactions    Latex     Hibiscus Extract Other (See Comments) and Swelling    Other Hives     Perfumes cause migraines, causes asthma attack         Current Outpatient Medications:     prazosin (MINIPRESS) 2 MG capsule, 2 po q hs for ptsd note change in dose, Disp: 60 capsule, Rfl: 0    methylphenidate (CONCERTA) 18 MG extended release tablet, Take 1 tablet by mouth daily for 30 days. , Disp: 30 tablet, Rfl: 0    FLUoxetine (PROZAC) 10 MG capsule, Take 4 capsules by mouth in the morning., Disp: 120 capsule, Rfl: 2    albuterol sulfate HFA (PROAIR HFA) 108 (90 Base) MCG/ACT inhaler, Inhale 2 puffs into the lungs every 6 hours as needed for Wheezing, Disp: 1 each, Rfl: 2    spironolactone (ALDACTONE) 50 MG tablet, TAKE 2 TABLETS BY MOUTH EVERY MORNING AND 1 TABLET EVERY EVENING AS DIRECTED, Disp: , Rfl:     fluticasone (FLONASE) 50 MCG/ACT nasal spray, 1 spray by Each Nostril route daily, Disp: 2 Bottle, Rfl: 1    Hyoscyamine Sulfate SL (LEVSIN/SL) 0.125 MG SUBL, 1 po up to qid for abdominal pain (Patient not taking: Reported on 10/30/2020), Disp: 40 each, Rfl: 0    omeprazole (PRILOSEC) 20 MG delayed release capsule, 1 po q day x 14 days (Patient not taking: Reported on 6/16/2020), Disp: 14 capsule, Rfl: 0    Patient Active Problem List   Diagnosis    Asthma    Migraine with aura and without status migrainosus, not intractable    Obesity (BMI 35.0-39.9 without comorbidity)    Acne    Congenital nevus    Mood disorder (HCC)    Iron deficiency    Lower abdominal pain    Concussion    Left ovarian cyst    Alcohol intoxication ([address]    Elevated blood pressure reading    Hypertension    ADHD    PTSD (post-traumatic stress disorder)    Preoperative clearance    Morbid obesity with BMI of 40.0-44.9, adult [facility])       Past Medical History:   Diagnosis Date    ADHD     Asthma     Depression     Elevated blood pressure reading 07/19/2022    Heavy menses 04/23/2019    Hypertension     Iron deficiency 04/23/2019    OCD (obsessive compulsive disorder)     PTSD (post-traumatic stress disorder)        Past Surgical History:   Procedure Laterality Date    COLONOSCOPY N/A 6/4/2019    COLONOSCOPY WITH BIOPSY performed by Smiley Parkinson MD at [address] [place] [address] N/A 6/4/2019    EGD BIOPSY performed by Smiley Parkinson MD at [place] [address]         Family History   Problem Relation Age of Onset    Mult Sclerosis Mother     Breast Cancer Paternal Grandmother     No Known Problems Father     Depression Sister     No Known Problems Brother     No Known Problems Maternal Aunt     No Known Problems Maternal Uncle     No Known Problems Paternal Aunt     No Known Problems Paternal Uncle     No Known Problems Maternal Grandmother     No Known Problems Maternal Grandfather     No Known Problems Paternal Grandfather     No Known Problems Other     Anesth Problems Neg Hx     Broken Bones Neg Hx     Cancer Neg Hx     Clotting Disorder Neg Hx     Collagen Disease Neg Hx     Diabetes Neg Hx     Dislocations Neg Hx     Osteoporosis Neg Hx     Rheumatologic Disease Neg Hx     Scoliosis Neg Hx     Severe Sprains Neg Hx        Review of Systems   Constitutional:  Negative for fatigue.    Eyes:  Negative for photophobia, pain and visual disturbance. Respiratory:  Negative for apnea, cough, choking, chest tightness, shortness of breath and wheezing. Cardiovascular:  Negative for chest pain, palpitations and leg swelling. Gastrointestinal:  Negative for abdominal distention, abdominal pain, blood in stool, constipation, diarrhea, nausea and vomiting. Endocrine: Negative for cold intolerance and heat intolerance. Musculoskeletal:  Negative for arthralgias and myalgias. Skin:  Negative for rash. Neurological:  Negative for dizziness, tremors, syncope, weakness, numbness and headaches. Psychiatric/Behavioral:  Negative for agitation, confusion, decreased concentration, dysphoric mood, hallucinations, sleep disturbance and suicidal ideas. The patient is not nervous/anxious and is not hyperactive. Physical Exam  Constitutional:       Appearance: She is well-developed. HENT:      Head: Normocephalic. Eyes:      Conjunctiva/sclera: Conjunctivae normal.   Abdominal:      General: Abdomen is protuberant. Musculoskeletal:         General: No swelling. Neurological:      Mental Status: She is alert and oriented to person, place, and time. Psychiatric:         Mood and Affect: Mood normal.         Behavior: Behavior normal.         Thought Content: Thought content normal.         Judgment: Judgment normal.       Nurse Only on 08/02/2022   Component Date Value Ref Range Status    Rejected Test 08/02/2022    Final    Reason for Rejection 08/02/2022 see below   Final    Comment: Unable to perform testing; specimen special handling  requirements not followed. To perform testing the  specimen will need to be recollected. Sp handle           Assessment and Plan:    1. Morbid obesity with BMI of 40.0-44.9, adult ([facility] [address]  Heavily counseled on the importance of therapeutic lifestyle changes through diet and exercise. The patient understands that the goal of treatment is to reach and stay at a healthy weight. The initial treatment goal is to lose at least 5-10% of her body weight in 12 weeks. This will require changes in eating habits, increased physical activity, and behavior changes. Counseled on low carb/sola diet. Patient handouts and education material provided and reviewed in detail with the patient. All questions answered. Encouraged patient to keep a food journal and to bring it to her next visit. Discussed available treatment options in addition to lifestyle changes including medications or OPTIFAST. She is interested in anti-obesity medications. Jordi Lente may be recommended at the next visit depending on her progress and lab results. Explained that medications/meal replacements are most effective as part of a comprehensive treatment plan that includes proper nutrition, physical activity, and behavior modification. The patient understands that she will need close follow-ups every 2-4 weeks if she starts treatment. Depending on the patient's success with these changes, she may also be a good candidate for bariatric surgery down the line. Follow-up in 2 weeks to discuss lab results and treatment plan. Patient advised that it is her responsibility to follow up on any ordered tests/lab results. Patient understands and agrees with the plan. - TSH with Reflex; Future  - Vitamin B12 & Folate; Future  - Vitamin D 25 Hydroxy; Future  - Comprehensive Metabolic Panel; Future  - Hemoglobin A1C; Future  - Lipid Panel; Future  - CBC; Future    2. Dietary counseling and surveillance  Start with nutrition plan as prescribed. Avoid skipping meals. Avoid evening/nighttime snacking. Use distraction techniques to avoid boredom/stress eating. Plan/prep meals ahead of time. Avoid soda/juice/sugary drinks. Be mindful of portion sizes.          Nutrition:  [] LCHF/Ketogenic [x] Low carb/low-calorie diet [] Low-calorie diet     []Maintenance        FITTE:   [x] Cardio [] Resistance/stength exercises   [x] ACSM recommendations (150 minutes/week)           Behavior:   [x] Motivational interviewing performed    [] Referral for counseling  [x] Discussed strategies to overcome habits/challenges for focus      [x] Stress management   [x] Stimulus control  [x] Sleep hygiene      Orders Placed This Encounter   Procedures    TSH with Reflex     Standing Status:   Future     Standing Expiration Date:   9/8/2023    Vitamin B12 & Folate     Standing Status:   Future     Standing Expiration Date:   9/8/2023    Vitamin D 25 Hydroxy     Standing Status:   Future     Standing Expiration Date:   9/8/2023    Comprehensive Metabolic Panel     Standing Status:   Future     Standing Expiration Date:   9/8/2023    Hemoglobin A1C     Standing Status:   Future     Standing Expiration Date:   9/8/2023    Lipid Panel     Standing Status:   Future     Standing Expiration Date:   9/8/2023    CBC     Standing Status:   Future     Standing Expiration Date:   9/8/2023       No follow-ups on file. Erie Litten is a 24 y.o. female being evaluated by a Virtual Visit (video visit) encounter to address concerns as mentioned above. A caregiver was present when appropriate. Due to this being a TeleHealth encounter (During [facility] public health emergency), evaluation of the following organ systems was limited: Vitals/Constitutional/EENT/Resp/CV/GI//MS/Neuro/Skin/Heme-Lymph-Imm. Pursuant to the emergency declaration under the [address] [place] [address] authority and the [employer] General Act, this Virtual Visit was conducted with patient's (and/or legal guardian's) consent, to reduce the patient's risk of exposure to COVID-19 and provide necessary medical care. The patient (and/or legal guardian) has also been advised to contact this office for worsening conditions or problems, and seek emergency medical treatment and/or call 911 if deemed necessary.        Services were provided through a video synchronous discussion virtually to substitute for in-person clinic visit. Patient and provider were located at their individual homes. --Antoinette Sanches MD on 10/5/2022 at 8:07 PM    An electronic signature was used to authenticate this note.      Greater than 50% of this 45 minute visit was used for  -Preparing to see the patient such as reviewing the pt records   Obtaining and/or reviewing separately obtained history   Performing a medically appropriate history    Counseling and educating the patient, family, and/or caregiver   Ordering prescirption medications, tests, or procedures   Documenting clinical information in the electronic or other health record

## 2022-09-09 ENCOUNTER — TELEPHONE (OUTPATIENT)
Dept: BARIATRICS/WEIGHT MGMT | Age: 27
End: 2022-09-09

## 2022-09-09 NOTE — TELEPHONE ENCOUNTER
Patient needs to schedule a follow-up appointment with Dr. Kristel Jennings in 2-4 weeks. Labs must be completed prior. Spoke with patient, She will call back to schedule.

## 2022-09-16 DIAGNOSIS — F90.9 ATTENTION DEFICIT HYPERACTIVITY DISORDER (ADHD), UNSPECIFIED ADHD TYPE: Primary | ICD-10-CM

## 2022-09-16 RX ORDER — METHYLPHENIDATE HYDROCHLORIDE 18 MG/1
18 TABLET ORAL DAILY
Qty: 15 TABLET | Refills: 0 | Status: SHIPPED | OUTPATIENT
Start: 2022-09-16 | End: 2022-10-04 | Stop reason: SDUPTHER

## 2022-09-26 RX ORDER — PRAZOSIN HYDROCHLORIDE 2 MG/1
CAPSULE ORAL
Qty: 60 CAPSULE | Refills: 0 | Status: SHIPPED | OUTPATIENT
Start: 2022-09-26 | End: 2022-11-04 | Stop reason: SDUPTHER

## 2022-09-27 ENCOUNTER — TELEPHONE (OUTPATIENT)
Dept: FAMILY MEDICINE CLINIC | Age: 27
End: 2022-09-27

## 2022-09-27 NOTE — TELEPHONE ENCOUNTER
Pt called back to let you know that she went to the [facility] clinic to get her eye checked out. So she doesn't need the appointment.

## 2022-09-27 NOTE — TELEPHONE ENCOUNTER
Pt called in stating that she has pink eye from her dog and wanted to know if the doctor could send over a antibiotic prescription to her pharmacy.     Please advise [phone]

## 2022-10-04 DIAGNOSIS — F90.9 ATTENTION DEFICIT HYPERACTIVITY DISORDER (ADHD), UNSPECIFIED ADHD TYPE: ICD-10-CM

## 2022-10-04 RX ORDER — METHYLPHENIDATE HYDROCHLORIDE 18 MG/1
18 TABLET ORAL DAILY
Qty: 30 TABLET | Refills: 0 | Status: SHIPPED | OUTPATIENT
Start: 2022-10-04 | End: 2022-11-03 | Stop reason: SDUPTHER

## 2022-10-04 NOTE — TELEPHONE ENCOUNTER
Refill request for Concerta 18 mg ER tablet. OARRS reviewed, LNF 9/16 for 15 days.  Send for 30 days

## 2022-10-05 ASSESSMENT — ENCOUNTER SYMPTOMS
NAUSEA: 0
SHORTNESS OF BREATH: 0
PHOTOPHOBIA: 0
CHEST TIGHTNESS: 0
WHEEZING: 0
APNEA: 0
COUGH: 0
EYE PAIN: 0
ABDOMINAL PAIN: 0
CONSTIPATION: 0
CHOKING: 0
ABDOMINAL DISTENTION: 0
BLOOD IN STOOL: 0
VOMITING: 0
DIARRHEA: 0

## 2022-10-07 PROBLEM — Z01.818 PREOPERATIVE CLEARANCE: Status: RESOLVED | Noted: 2022-09-07 | Resolved: 2022-10-07

## 2022-10-21 ENCOUNTER — TELEPHONE (OUTPATIENT)
Dept: FAMILY MEDICINE CLINIC | Age: 27
End: 2022-10-21

## 2022-10-21 NOTE — TELEPHONE ENCOUNTER
LVM to call and schedule at [facility] because no new provider is starting. Dr Margo Chirinos is retiring. Gave number for [facility], [phone].      Dr Jerry Wright or Dr Willy Aburto

## 2022-10-25 RX ORDER — PRAZOSIN HYDROCHLORIDE 2 MG/1
CAPSULE ORAL
Qty: 60 CAPSULE | Refills: 0 | OUTPATIENT
Start: 2022-10-25

## 2022-10-25 NOTE — TELEPHONE ENCOUNTER
Medication:   Requested Prescriptions     Pending Prescriptions Disp Refills    prazosin (MINIPRESS) 2 MG capsule [Pharmacy Med Name: PRAZOSIN 2 MG CAPSULE] 60 capsule 0     Sig: TAKE TWO CAPSULES BY MOUTH EVERY NIGHT AT BEDTIME FOR PTSD       Last Filled:      Patient Phone Number: [phone] (home) [phone] (work)    Last appt: 7/19/2022   Next appt: 10/25/2022

## 2022-10-25 NOTE — TELEPHONE ENCOUNTER
Medication:   Requested Prescriptions     Pending Prescriptions Disp Refills    prazosin (MINIPRESS) 2 MG capsule 60 capsule 0     Si po q hs for ptsd note change in dose       Last Filled:      Patient Phone Number: [phone] (home) [phone] (work)    Last appt: 2022   Next appt: Visit date not found

## 2022-11-03 DIAGNOSIS — F90.9 ATTENTION DEFICIT HYPERACTIVITY DISORDER (ADHD), UNSPECIFIED ADHD TYPE: ICD-10-CM

## 2022-11-04 ENCOUNTER — TELEPHONE (OUTPATIENT)
Dept: PSYCHIATRY | Age: 27
End: 2022-11-04

## 2022-11-04 ENCOUNTER — OFFICE VISIT (OUTPATIENT)
Dept: PSYCHIATRY | Age: 27
End: 2022-11-04
Payer: COMMERCIAL

## 2022-11-04 VITALS — HEIGHT: 65 IN | WEIGHT: 265 LBS | BODY MASS INDEX: 44.15 KG/M2

## 2022-11-04 DIAGNOSIS — F90.9 ATTENTION DEFICIT HYPERACTIVITY DISORDER (ADHD), UNSPECIFIED ADHD TYPE: ICD-10-CM

## 2022-11-04 DIAGNOSIS — F39 MOOD DISORDER (HCC): ICD-10-CM

## 2022-11-04 PROCEDURE — 99212 OFFICE O/P EST SF 10 MIN: CPT | Performed by: NURSE PRACTITIONER

## 2022-11-04 RX ORDER — METHYLPHENIDATE HYDROCHLORIDE 27 MG/1
27 TABLET ORAL DAILY
Qty: 30 TABLET | Refills: 0 | Status: SHIPPED | OUTPATIENT
Start: 2022-11-04 | End: 2022-12-04

## 2022-11-04 RX ORDER — METHYLPHENIDATE HYDROCHLORIDE 18 MG/1
18 TABLET ORAL DAILY
Qty: 30 TABLET | Refills: 0 | Status: SHIPPED | OUTPATIENT
Start: 2022-11-04 | End: 2022-11-04

## 2022-11-04 RX ORDER — FLUOXETINE HYDROCHLORIDE 40 MG/1
40 CAPSULE ORAL DAILY
Qty: 30 CAPSULE | Refills: 2 | Status: SHIPPED | OUTPATIENT
Start: 2022-11-04 | End: 2023-02-02

## 2022-11-04 RX ORDER — PRAZOSIN HYDROCHLORIDE 2 MG/1
CAPSULE ORAL
Qty: 60 CAPSULE | Refills: 2 | Status: SHIPPED | OUTPATIENT
Start: 2022-11-04

## 2022-11-04 RX ORDER — FLUOXETINE HYDROCHLORIDE 20 MG/1
20 CAPSULE ORAL DAILY
Qty: 30 CAPSULE | Refills: 2 | Status: SHIPPED | OUTPATIENT
Start: 2022-11-04

## 2022-11-04 ASSESSMENT — ANXIETY QUESTIONNAIRES
1. FEELING NERVOUS, ANXIOUS, OR ON EDGE: 3
3. WORRYING TOO MUCH ABOUT DIFFERENT THINGS: 3
6. BECOMING EASILY ANNOYED OR IRRITABLE: 3
IF YOU CHECKED OFF ANY PROBLEMS ON THIS QUESTIONNAIRE, HOW DIFFICULT HAVE THESE PROBLEMS MADE IT FOR YOU TO DO YOUR WORK, TAKE CARE OF THINGS AT HOME, OR GET ALONG WITH OTHER PEOPLE: EXTREMELY DIFFICULT
4. TROUBLE RELAXING: 3
5. BEING SO RESTLESS THAT IT IS HARD TO SIT STILL: 2
7. FEELING AFRAID AS IF SOMETHING AWFUL MIGHT HAPPEN: 3
GAD7 TOTAL SCORE: 20
2. NOT BEING ABLE TO STOP OR CONTROL WORRYING: 3

## 2022-11-04 ASSESSMENT — PATIENT HEALTH QUESTIONNAIRE - PHQ9
SUM OF ALL RESPONSES TO PHQ QUESTIONS 1-9: 19
2. FEELING DOWN, DEPRESSED OR HOPELESS: 2
6. FEELING BAD ABOUT YOURSELF - OR THAT YOU ARE A FAILURE OR HAVE LET YOURSELF OR YOUR FAMILY DOWN: 2
4. FEELING TIRED OR HAVING LITTLE ENERGY: 3
SUM OF ALL RESPONSES TO PHQ QUESTIONS 1-9: 19
10. IF YOU CHECKED OFF ANY PROBLEMS, HOW DIFFICULT HAVE THESE PROBLEMS MADE IT FOR YOU TO DO YOUR WORK, TAKE CARE OF THINGS AT HOME, OR GET ALONG WITH OTHER PEOPLE: 3
5. POOR APPETITE OR OVEREATING: 2
3. TROUBLE FALLING OR STAYING ASLEEP: 3
SUM OF ALL RESPONSES TO PHQ9 QUESTIONS 1 & 2: 5
SUM OF ALL RESPONSES TO PHQ QUESTIONS 1-9: 19
1. LITTLE INTEREST OR PLEASURE IN DOING THINGS: 3
SUM OF ALL RESPONSES TO PHQ QUESTIONS 1-9: 19
9. THOUGHTS THAT YOU WOULD BE BETTER OFF DEAD, OR OF HURTING YOURSELF: 0
7. TROUBLE CONCENTRATING ON THINGS, SUCH AS READING THE NEWSPAPER OR WATCHING TELEVISION: 3
8. MOVING OR SPEAKING SO SLOWLY THAT OTHER PEOPLE COULD HAVE NOTICED. OR THE OPPOSITE, BEING SO FIGETY OR RESTLESS THAT YOU HAVE BEEN MOVING AROUND A LOT MORE THAN USUAL: 1

## 2022-11-04 NOTE — PROGRESS NOTES
PSYCHIATRY FOLLOW UP EVALUATION      Jaz Lagunas  1995 11/04/22    CC:   Chief Complaint   Patient presents with    Follow-up       HPI:   Referred by Lindsey Thomas MD.     Jaz Lagunas is a 24 y.o. female with a history of Depression, Trauma, Anxiety being evaluated by a Virtual Visit (video visit) encounter to address concerns as mentioned above. A caregiver was present when appropriate. Patient was evaluated through a synchronous (real-time) audio-video encounter. The patient (or guardian if applicable) is aware that this a billable service, which includes applicable co-pays. The virtual visit was conducted with patient or legal guardians consent to reduce the patient's risk of exposure to COVID-19. The visit was conducted pursuant to the emergency declaration under the [address] [place] [address] authority and the [employer] General Act. Patient identified was verified, and a caregiver was present when appropriate. The patient was located in a state where the provider was licensed to provider care. The patient (and/or legal guardian) has also been advised to contact this office for worsening conditions or problems, and seek emergency medical treatment and/or call 911 if deemed necessary. Patient identification verified: Yes  Patient location: Home  Phone call start time: 3:34 pm  Phone call end time: 3:45 pm    Subjective/Interval Hx: Mother in law passed away two weeks ago. Resigned from her job. Patient believes that she might have Autism and not OCD. I will be sending her Autism testing facilities per request. Seeing her therapist weekly. Start EMDR the exact time her mother  in law passed away so she is definitely feeling low, down, depressed due to bringing up past trauma and losing a family member. More anxious, more depressed, ADHD seems to be not controlled with all of these stressors.      Location: Mind  Severity: Mod    Context:  As above. Modifiers: Worsening with stressors  Quality: Anxiety, depressed, ADHD symptoms, low mood    Past Psychiatric History:               Prior hospitalizations: Hospitalized after SA in high school, 2 week psych [facility]              Prior diagnoses: Anxiety, OCD, Depression, ADHD              Outpatient Treatment:                           Psychiatrist: Millie slaughter, Dr. Marika Salinas                          Therapist: Alexander Fernandez current               Suicide Attempts: Sophomore Year SA via Wellbutrin              Hx SH:  Cutting in high school     Past Psychopharmacologic Trials (including response/reactions):  Prazosin  Wellbutrin       PAOLA 7 SCORE 11/4/2022 9/2/2022 7/19/2022 6/29/2022   PAOLA-7 Total Score 20 17 15 21     Interpretation of PAOLA-7 score: 5-9 = mild anxiety, 10-14 = moderate anxiety,   15+ = severe anxiety. Recommend referral to behavioral health for scores 10 or greater.     PHQ-9 Total Score: 19 (11/4/2022  3:08 PM)  Thoughts that you would be better off dead, or of hurting yourself in some way: 0 (11/4/2022  3:08 PM)    Interpretation of PHQ-9 score:  1-4 = minimal depression, 5-9 = mild depression,   10-14 = moderate depression; 15-19 = moderately severe depression, 20-27 = severe depression    Past Medical/Surgical History:   Past Medical History:   Diagnosis Date    ADHD     Asthma     Depression     Elevated blood pressure reading 07/19/2022    Heavy menses 04/23/2019    Hypertension     Iron deficiency 04/23/2019    OCD (obsessive compulsive disorder)     PTSD (post-traumatic stress disorder)      Past Surgical History:   Procedure Laterality Date    COLONOSCOPY N/A 6/4/2019    COLONOSCOPY WITH BIOPSY performed by Denise Stratton MD at [address] [place] [address] N/A 6/4/2019    EGD BIOPSY performed by Denise Stratton MD at [place] [address]         Family History   Problem Relation Age of Onset    Mult Sclerosis Mother     Breast Cancer Paternal Grandmother     No Known Problems Father     Depression Sister     No Known Problems Brother     No Known Problems Maternal Aunt     No Known Problems Maternal Uncle     No Known Problems Paternal Aunt     No Known Problems Paternal Uncle     No Known Problems Maternal Grandmother     No Known Problems Maternal Grandfather     No Known Problems Paternal Grandfather     No Known Problems Other     Anesth Problems Neg Hx     Broken Bones Neg Hx     Cancer Neg Hx     Clotting Disorder Neg Hx     Collagen Disease Neg Hx     Diabetes Neg Hx     Dislocations Neg Hx     Osteoporosis Neg Hx     Rheumatologic Disease Neg Hx     Scoliosis Neg Hx     Severe Sprains Neg Hx          PCP: Luis Mistry MD      Allergies: Allergies   Allergen Reactions    Latex     Hibiscus Extract Other (See Comments) and Swelling    Other Hives     Perfumes cause migraines, causes asthma attack         Current Medications:   Current Outpatient Medications on File Prior to Visit   Medication Sig Dispense Refill    albuterol sulfate HFA (PROAIR HFA) 108 (90 Base) MCG/ACT inhaler Inhale 2 puffs into the lungs every 6 hours as needed for Wheezing 1 each 2    spironolactone (ALDACTONE) 50 MG tablet TAKE 2 TABLETS BY MOUTH EVERY MORNING AND 1 TABLET EVERY EVENING AS DIRECTED      fluticasone (FLONASE) 50 MCG/ACT nasal spray 1 spray by Each Nostril route daily 2 Bottle 1    Hyoscyamine Sulfate SL (LEVSIN/SL) 0.125 MG SUBL 1 po up to qid for abdominal pain (Patient not taking: Reported on 10/30/2020) 40 each 0    omeprazole (PRILOSEC) 20 MG delayed release capsule 1 po q day x 14 days (Patient not taking: Reported on 6/16/2020) 14 capsule 0     No current facility-administered medications on file prior to visit. Controlled Substance Monitoring:      OBJECTIVE:  Vitals:    Wt Readings from Last 3 Encounters:   11/04/22 265 lb (120.2 kg)   09/07/22 270 lb (122.5 kg)   09/02/22 269 lb (122 kg)       Vitals:    11/04/22 1508   Weight: 265 lb (120.2 kg)   Height: 5' 5\" (1.651 m)     ROS: Denies trouble with fever, rash, headache, vision changes, chest pain, shortness of breath, nausea, extremity pain, weakness, dysuria. Mental Status Exam:      Appearance    unable to assess d/t f/u visit completed via pc  Muscle strength/tone: unable to assess d/t f/u visit completed via pc  Gait/station: unable to assess d/t f/u visit completed via pc   Speech    spontaneous, normal rate, and normal volume  Mood    Anxious  Depressed  Affect    depressed affect Congruent to thought content and mood  Thought Content    hopelessness, no delusions voiced  Thought Process    linear   Associations    logical connections  Perceptions: denies AH/VH, does not appear preoccupied with the internal environment  Insight    Good  Judgment   Good  Orientation    oriented to person, place, time, and general circumstances  Memory    recent and remote memory intact  Attention/Concentration    intact  Ability to understand instructions Yes  Ability to respond meaningfully Yes  Language: [address] [place] [address] of knowledge/Intellect: Average  SI:   no suicidal ideation  HI: Denies HI    Labs:     Nurse Only on 08/02/2022   Component Date Value    Rejected Test 08/02/2022      Reason for Rejection 08/02/2022 see below        Last Drug screen:  Lab Results   Component Value Date/Time    LABAMPH Neg 03/20/2013 07:30 PM    LABBENZ Neg 03/20/2013 07:30 PM    COCAIMETSCRU Neg 03/20/2013 07:30 PM    LABMETH Neg 03/20/2013 07:30 PM    OPIATESCREENURINE Neg 03/20/2013 07:30 PM    PHENCYCLIDINESCREENURINE Neg 03/20/2013 07:30 PM         Imaging: no head imaging on file      ASSESSMENT AND PLAN     Diagnosis Orders   1. Attention deficit hyperactivity disorder (ADHD), unspecified ADHD type  methylphenidate (CONCERTA) 27 MG extended release tablet      2. Mood disorder (HCC)  FLUoxetine (PROZAC) 40 MG capsule          1.  Safety: NO Imminent risk of danger to/self/others based on the factors considered below. Appropriate for outpatient level of care. Safety plan includes: 911, PES, hotlines, and interventions discussed today. Risk factors: Age <25 or >55,  prior suicide attempt,  and no collateral information to support safety. 2. Psychiatric Plan     ADHD, YOHANA: Increase Concerta to 26 mg ER> R/b/se discussed. PTSD, OCD: Increase Prozac to 60 mg daily with worsening depression with stressors. Continue Prazosin 2 mg nightly.     -Labs: reviewed in Epic     -Recommend outpt therapy. Continue EMDR, Bing   [address] reviewed, c/w history  -R/b/se/a d/w pt who consents. 3. Medical  -Following with Nolberto Marshall MD     4. Substance   -No active issues. 5. RTC - 2 months     [facility] BERTO James CNP  Psychiatric Mental Health Nurse Practitioner     On this date 11/4/2022 I have spent 15 minutes reviewing previous notes, test results and face to face with the patient discussing the diagnosis and importance of compliance with the treatment plan as well as documenting on the day of the visit.

## 2022-12-11 DIAGNOSIS — F90.9 ATTENTION DEFICIT HYPERACTIVITY DISORDER (ADHD), UNSPECIFIED ADHD TYPE: ICD-10-CM

## 2022-12-13 RX ORDER — METHYLPHENIDATE HYDROCHLORIDE 27 MG/1
27 TABLET ORAL DAILY
Qty: 30 TABLET | Refills: 0 | Status: SHIPPED | OUTPATIENT
Start: 2022-12-13 | End: 2023-01-12

## 2023-01-10 ENCOUNTER — TELEPHONE (OUTPATIENT)
Dept: FAMILY MEDICINE CLINIC | Age: 28
End: 2023-01-10

## 2023-01-11 DIAGNOSIS — F90.9 ATTENTION DEFICIT HYPERACTIVITY DISORDER (ADHD), UNSPECIFIED ADHD TYPE: ICD-10-CM

## 2023-01-11 RX ORDER — METHYLPHENIDATE HYDROCHLORIDE 27 MG/1
27 TABLET ORAL DAILY
Qty: 30 TABLET | Refills: 0 | Status: SHIPPED | OUTPATIENT
Start: 2023-01-11 | End: 2023-02-10

## 2023-01-13 NOTE — TELEPHONE ENCOUNTER
Pt called requesting a refill of her methylphenidate (CONCERTA) 27 MG extended release tablet [facility] [place] [phone]. Side note: Pt has not yet established care with a new provider.
Pt notified
Refill sent
Wendy Ascencio

## 2023-01-26 ENCOUNTER — OFFICE VISIT (OUTPATIENT)
Dept: FAMILY MEDICINE CLINIC | Age: 28
End: 2023-01-26
Payer: COMMERCIAL

## 2023-01-26 VITALS
WEIGHT: 266.6 LBS | HEART RATE: 95 BPM | BODY MASS INDEX: 44.42 KG/M2 | OXYGEN SATURATION: 99 % | DIASTOLIC BLOOD PRESSURE: 60 MMHG | SYSTOLIC BLOOD PRESSURE: 135 MMHG | HEIGHT: 65 IN | TEMPERATURE: 98.7 F

## 2023-01-26 DIAGNOSIS — G43.109 MIGRAINE WITH AURA AND WITHOUT STATUS MIGRAINOSUS, NOT INTRACTABLE: ICD-10-CM

## 2023-01-26 DIAGNOSIS — F43.10 PTSD (POST-TRAUMATIC STRESS DISORDER): ICD-10-CM

## 2023-01-26 DIAGNOSIS — I10 PRIMARY HYPERTENSION: Primary | ICD-10-CM

## 2023-01-26 PROCEDURE — 3074F SYST BP LT 130 MM HG: CPT | Performed by: FAMILY MEDICINE

## 2023-01-26 PROCEDURE — 99214 OFFICE O/P EST MOD 30 MIN: CPT | Performed by: FAMILY MEDICINE

## 2023-01-26 PROCEDURE — 3078F DIAST BP <80 MM HG: CPT | Performed by: FAMILY MEDICINE

## 2023-01-26 RX ORDER — FLUOXETINE HYDROCHLORIDE 20 MG/1
20 CAPSULE ORAL DAILY
Qty: 30 CAPSULE | Refills: 0 | Status: SHIPPED | OUTPATIENT
Start: 2023-01-26

## 2023-01-26 RX ORDER — FLUOXETINE HYDROCHLORIDE 40 MG/1
40 CAPSULE ORAL DAILY
Qty: 30 CAPSULE | Refills: 0 | Status: SHIPPED | OUTPATIENT
Start: 2023-01-26 | End: 2023-04-26

## 2023-01-26 ASSESSMENT — PATIENT HEALTH QUESTIONNAIRE - PHQ9
9. THOUGHTS THAT YOU WOULD BE BETTER OFF DEAD, OR OF HURTING YOURSELF: 0
2. FEELING DOWN, DEPRESSED OR HOPELESS: 0
5. POOR APPETITE OR OVEREATING: 0
10. IF YOU CHECKED OFF ANY PROBLEMS, HOW DIFFICULT HAVE THESE PROBLEMS MADE IT FOR YOU TO DO YOUR WORK, TAKE CARE OF THINGS AT HOME, OR GET ALONG WITH OTHER PEOPLE: 0
8. MOVING OR SPEAKING SO SLOWLY THAT OTHER PEOPLE COULD HAVE NOTICED. OR THE OPPOSITE, BEING SO FIGETY OR RESTLESS THAT YOU HAVE BEEN MOVING AROUND A LOT MORE THAN USUAL: 0
4. FEELING TIRED OR HAVING LITTLE ENERGY: 0
6. FEELING BAD ABOUT YOURSELF - OR THAT YOU ARE A FAILURE OR HAVE LET YOURSELF OR YOUR FAMILY DOWN: 0
SUM OF ALL RESPONSES TO PHQ QUESTIONS 1-9: 0
1. LITTLE INTEREST OR PLEASURE IN DOING THINGS: 0
7. TROUBLE CONCENTRATING ON THINGS, SUCH AS READING THE NEWSPAPER OR WATCHING TELEVISION: 0
SUM OF ALL RESPONSES TO PHQ9 QUESTIONS 1 & 2: 0
SUM OF ALL RESPONSES TO PHQ QUESTIONS 1-9: 0
3. TROUBLE FALLING OR STAYING ASLEEP: 0

## 2023-01-26 NOTE — PROGRESS NOTES
A/P:    Diagnosis Orders   1. Primary hypertension  Comprehensive Metabolic Panel    TSH    Cortisol AM, Total    Lipid Panel      2. BMI 40.0-44.9, adult (HCC)  Comprehensive Metabolic Panel    TSH    Cortisol AM, Total    Lipid Panel    Hemoglobin A1C      3. PTSD (post-traumatic stress disorder)          Hypertension is controlled, she will continue current meds    BMI wise, check above lab work, she will continue working on lifestyle optimization strategies    PTSD, depression, ADHD wise, she will continue current meds and follow-up with NP psychiatry, she agrees to get medical attention if SI or HI should develop    Her asthma is controlled, she will continue with albuterol rescue inhaler    She will make fasting lab appointment in the next 1 to 2 weeks, Will arrange follow-up based on lab results    O: /60   Pulse 95   Temp 98.7 °F (37.1 °C)   Ht 5' 5\" (1.651 m)   Wt 266 lb 9.6 oz (120.9 kg)   LMP 01/25/2023   SpO2 99%   BMI 44.36 kg/m²    Gen- NAD, pleasant  HEENT- Eyes without icterus or injection  Neck- Supple, no lymphadenopathy appreciated  Lungs- CTAB  Heart- RRR  Abd- Soft, non tender  Ext- No edema  Psych- Appropriate    S: CC-establish care  HPI-patient presents to establish care with new clinic provider. She reports she is doing well. She is seeing psychiatry NP for her PTSD, depression, ADHD. She reports she is doing reasonably well mood wise. She had to stop prazosin that was prescribed for her blood pressure and nightmares due to it keeping her awake. She is doing okay off medicine. She is trying to lose weight on her own.     ROS-denies fever, chills, night sweats  Denies chest pain, dyspnea, palpitations  Denies leg swelling    Patient's history was reviewed and updated

## (undated) DEVICE — CONTAINER SPEC 480ML CLR POLYSTYR 10% NEUT BUFF FRMLN ZN

## (undated) DEVICE — FORCEPS BX 240CM 2.4MM L NDL RAD JAW 4 M00513334

## (undated) DEVICE — ENDOSCOPY KIT: Brand: MEDLINE INDUSTRIES, INC.

## (undated) DEVICE — BITE BLK 60FR GRN ENDOSCP AD W STRP SLD DISPOSABLE